# Patient Record
Sex: FEMALE | Race: BLACK OR AFRICAN AMERICAN | NOT HISPANIC OR LATINO | URBAN - METROPOLITAN AREA
[De-identification: names, ages, dates, MRNs, and addresses within clinical notes are randomized per-mention and may not be internally consistent; named-entity substitution may affect disease eponyms.]

---

## 2021-02-24 ENCOUNTER — OFFICE VISIT (OUTPATIENT)
Dept: GASTROENTEROLOGY | Facility: CLINIC | Age: 46
End: 2021-02-24
Payer: COMMERCIAL

## 2021-02-24 VITALS
WEIGHT: 265 LBS | TEMPERATURE: 98 F | DIASTOLIC BLOOD PRESSURE: 82 MMHG | BODY MASS INDEX: 44.15 KG/M2 | HEIGHT: 65 IN | SYSTOLIC BLOOD PRESSURE: 128 MMHG

## 2021-02-24 DIAGNOSIS — K59.09 CHRONIC CONSTIPATION: ICD-10-CM

## 2021-02-24 DIAGNOSIS — R13.10 DYSPHAGIA, UNSPECIFIED TYPE: ICD-10-CM

## 2021-02-24 DIAGNOSIS — R10.13 DYSPEPSIA: ICD-10-CM

## 2021-02-24 DIAGNOSIS — K21.9 GASTROESOPHAGEAL REFLUX DISEASE, UNSPECIFIED WHETHER ESOPHAGITIS PRESENT: Primary | ICD-10-CM

## 2021-02-24 PROCEDURE — 99204 OFFICE O/P NEW MOD 45 MIN: CPT | Performed by: INTERNAL MEDICINE

## 2021-02-24 RX ORDER — PANTOPRAZOLE SODIUM 40 MG/1
40 TABLET, DELAYED RELEASE ORAL DAILY
Qty: 30 TABLET | Refills: 5 | Status: SHIPPED | OUTPATIENT
Start: 2021-02-24 | End: 2021-08-21

## 2021-02-24 RX ORDER — LINACLOTIDE 145 UG/1
145 CAPSULE, GELATIN COATED ORAL DAILY
Qty: 30 CAPSULE | Refills: 5 | Status: SHIPPED | OUTPATIENT
Start: 2021-02-24 | End: 2021-09-18

## 2021-02-24 NOTE — PROGRESS NOTES
[facility] - Outpatient Consultation  Jennifer Silvestre 41 y o  female MRN: [id number]  Encounter: [id number]          ASSESSMENT AND PLAN:      43-year-old with diabetes, history of Gerald-en-Y gastric bypass, fibromyalgia, depression / anxiety, irritable bowel syndrome presents for evaluation  1   GERD   Uncontrolled  Could be likely causing nausea, vomiting and dysphagia  We will review EGD records  If EGD did not show any abnormality in the esophagus  And no change in symptoms with PPI then would get esophagram and esophageal manometry with pH study  Will start patient on PPI 40 mg daily  2  Dyspepsia  Could be due to history of gastric bypass  Other differentials include ulcer disease, gastritis, H pylori, functional   Will review EGD and colonoscopy records  Started patient on PPI  3  Chronic constipation  Refractory to MiraLax and Colace  Patient getting enemas for relief  Will start patient on Linzess  Continue other management  Will review colonoscopy records  If colonoscopy does not show any abnormality and no relief with bowel regimen then would get anorectal manometry to investigate further  Wilmar Hinson MD  Gastroenterology Fellow  [address]  Date: February 24, 2021    ______________________________________________________________________    HPI:    43-year-old with diabetes, history of Gerald-en-Y gastric bypass, fibromyalgia, depression / anxiety, irritable bowel syndrome presents for evaluation  Consult note from bariatric surgery mention esophageal spasms seen on upper GI series  No imaging to review in the records  Patient says that since Gerald-en-Y gastric bypass in 2012 she has been having nausea, vomiting, heartburn, abdominal pain along with hard stools intermittently  Currently she does not take any medications for heartburn  No blood in vomiting  She has noticed some bright red blood in stools    She is currently taking MiraLax 4 times a day along with Colace but no relief in constipation  She is having weight gain  No family history of GI or liver cancer  Nonsmoker, no alcohol intake, no NSAID intake  She had EGD and colonoscopy in July 2020 at [facility]  No records to review from the procedures in at today  REVIEW OF SYSTEMS:    CONSTITUTIONAL: Denies any fever, chills, rigors, and weight loss  HEENT: No earache or tinnitus  Denies hearing loss or visual disturbances  CARDIOVASCULAR: No chest pain or palpitations  RESPIRATORY: Denies any cough, hemoptysis, shortness of breath or dyspnea on exertion  GASTROINTESTINAL: As noted in the History of Present Illness  GENITOURINARY: No problems with urination  Denies any hematuria or dysuria  NEUROLOGIC: No dizziness or vertigo, denies headaches  MUSCULOSKELETAL: Denies any muscle or joint pain  SKIN: Denies skin rashes or itching  ENDOCRINE: Denies excessive thirst  Denies intolerance to heat or cold  PSYCHOSOCIAL: Denies depression or anxiety  Denies any recent memory loss  Historical Information   No past medical history on file  No past surgical history on file  Social History   Social History     Substance and Sexual Activity   Alcohol Use Not on file     Social History     Substance and Sexual Activity   Drug Use Not on file     Social History     Tobacco Use   Smoking Status Not on file     No family history on file  Meds/Allergies     No current outpatient medications on file  Not on File        Objective     There were no vitals taken for this visit  There is no height or weight on file to calculate BMI          PHYSICAL EXAM:      General Appearance:   Alert, cooperative, no distress   HEENT:   Normocephalic, atraumatic, anicteric      Neck:  Supple, symmetrical, trachea midline   Lungs:   Clear to auscultation bilaterally; no rales, rhonchi or wheezing; respirations unlabored    Heart[de-identified]   Regular rate and rhythm; no murmur, rub, or gallop  Abdomen:   Soft, non-tender, non-distended; normal bowel sounds; no masses, no organomegaly    Genitalia:   Deferred    Rectal:   Deferred    Extremities:  No cyanosis, clubbing or edema    Pulses:  2+ and symmetric    Skin:  No jaundice, rashes, or lesions    Lymph nodes:  No palpable cervical lymphadenopathy        Lab Results:   No visits with results within 1 Day(s) from this visit  Latest known visit with results is:   No results found for any previous visit  Radiology Results:   No results found

## 2021-03-01 ENCOUNTER — TELEPHONE (OUTPATIENT)
Dept: GASTROENTEROLOGY | Facility: CLINIC | Age: 46
End: 2021-03-01

## 2021-03-01 NOTE — TELEPHONE ENCOUNTER
[address] at [phone], and left VM at their Medical records department requesting pt's EGD/Colon report to be faxed to our office

## 2021-03-04 ENCOUNTER — TELEPHONE (OUTPATIENT)
Dept: GASTROENTEROLOGY | Facility: AMBULARY SURGERY CENTER | Age: 46
End: 2021-03-04

## 2021-03-04 NOTE — TELEPHONE ENCOUNTER
Patients GI provider:  Dr Sonia Paige    Number to return call: ( [phone]    Reason for call: Pt calling with stomach pain, constipation  and vomiting    Scheduled procedure/appointment date if applicable: Apt/procedure 5-19-21

## 2021-03-05 NOTE — TELEPHONE ENCOUNTER
Patient has hx of RYGB, GERD, dyspepsia IBS with constipation  Recommend start PPI and linzess at office visit Feb 24  Get colonoscopy/egd notes  Patient is reporting constipation, despite miralax, colace and enemas; also reporting vomiting after meals and said she is now on protein shakes only  She had telemedicine visit with bariatrics on Feb 17  She said she was told EGD showed esophageal spasm  Recommend continue PPI as ordered (only on one week)  She said linzess needs prior auth so unable to start  She is also in process of getting records from [facility] for last colonoscopy/egd  Follow up scheduled May 19  Please provide recommendation, do you think antiemetic would help  Thank you      Clinical Team:  Please initiate prior auth for LInzess 145 mcg and offer samples to patient (if we have)

## 2021-03-05 NOTE — TELEPHONE ENCOUNTER
I will start prior auth for Linzess today, is it ok to give patient samples in the meantime? Please advise        thank you

## 2021-03-05 NOTE — TELEPHONE ENCOUNTER
Patient is unable to get samples due to where she lives, she lives in [facility]

## 2021-03-09 ENCOUNTER — TELEPHONE (OUTPATIENT)
Dept: GASTROENTEROLOGY | Facility: CLINIC | Age: 46
End: 2021-03-09

## 2021-03-09 NOTE — TELEPHONE ENCOUNTER
Called [facility] for an update on prior auth for Linzess  I spoke to [facility], who states Rogerio Limon was denied  She faxed denial letter to office   Waiting to receive this and will notify provider

## 2021-03-09 NOTE — TELEPHONE ENCOUNTER
Hi Dr Shruti Davidson,    I received a denial letter for Yuri Salazar for this patient, insurance states it was denied due to: The patient must be treated for a diagnosis that is indicted in the FDA-approved package labeling such as :  -Chronic Idiopathic constipation  -IBS w/ Constipation   Or a medically accepted indication  Also need documentation that shows the patient has tried and failed therapy, had a intolerance or contraindication to :  -Fiber supplementation/bulk forming agents: such as fiber tablets 625mg or fiber powder   -Glycerin or bisacodyl suppositories     Letter scanned under media     Please advise how you would like me to proceed

## 2021-03-11 NOTE — TELEPHONE ENCOUNTER
He has chronic refractory idiopathic constipation and has failed prior therapies  This is acceptable indication for Linzess approval   Please kindly helped

## 2021-03-12 NOTE — TELEPHONE ENCOUNTER
Called [facility] for an update and spoke to Grisel Crowder who states additional information takes 24 hrs to be added into patient's chart, for me to call back this afternoon for an update

## 2021-03-12 NOTE — TELEPHONE ENCOUNTER
Hi Dr Thermon Blizzard,    I called [facility] for an update on this and there was not enough information provided  Can you write an addendum including the correct diagnosis of (chronic refractory idiopathic constipation)   As well as additional documentation that shows the patient has tried and failed therapy, had a intolerance or contraindication to :  -Fiber supplementation/bulk forming agents: such as fiber tablets 625mg or fiber powder   -Glycerin or bisacodyl suppositories       They are requesting specific medication names and I don't see any of these in your office note         Thank you

## 2021-03-17 NOTE — TELEPHONE ENCOUNTER
Thank you for calling her  Yes we can continue with dulcolax for now  Hold off on linzess for now   Thanks

## 2021-03-17 NOTE — TELEPHONE ENCOUNTER
I spoke to patient she states she has tried Fiber in the past but it actually made her constipation worse  She has also tried suppositories but did not have much success so she sticks with enemas  Patient  has purchased otc dulcolax chewables -she has been taking 2 in the AM, and 2 in the PM since the weekend, and has actually not been experiencing any constipation, she actually had a accident this morning  She wanted to know since the dulcolax is helping could you prescribe something similar to that for her to try and if so we can hold off on trying the get Linzess approved         Please advise,         Thank you

## 2021-03-17 NOTE — TELEPHONE ENCOUNTER
I called patient multiple times but not talk to him on the phone  Left voicemail to call back at the clinic  I hoped to clarify with him about fibre use and response  She is taking enemas currently for relief which is similar to suppositories  Could you please call patient again and check about use and response to these medications  Thank you

## 2021-03-19 ENCOUNTER — TELEPHONE (OUTPATIENT)
Dept: GASTROENTEROLOGY | Facility: AMBULARY SURGERY CENTER | Age: 46
End: 2021-03-19

## 2021-03-19 NOTE — TELEPHONE ENCOUNTER
Patients GI provider:  Dr Mary Diaz     Number to return call: [phone]    Reason for call: Pt calling requesting to speak with a nurse in reference to a chewable dulcolax that it stopped working for her with extreme constipation       Scheduled procedure/appointment date if applicable: Apt/procedure 5/19/21

## 2021-03-22 NOTE — TELEPHONE ENCOUNTER
Patient of Dr Jeromy Quijano, last seen 2/24/21    History of RXYGB, GERD, dyspepsia, chronic constipation    Called and spoke with patient  She states that she has been using chewable dulcolax 4x per day and saline enemas daily  At last appt linzess was prescribed but we were not able to get this medication approved (see 3/9 encounter)  Patient states that the dulcolax chewables had been working for her but then they stopped and she had several days with no bowel movement  She states she did have a bowel movement this morning but states oftentimes when she goes she feels she is not completely emptying her bowels  She has tried fiber in the past as well and felt this worsened her constipation  Patient states that she was already on Linzess many years ago when she was a patient with [place]  She states it worked at first and stopped working over time  She also is concerned that when she gives herself enemas, she feels that there is a "bloackage" and the fluid has a hard time going in  Patient states her most recent colonoscopy was a year ago with [facility] but every time she has a colonoscopy it's a poor prep because she's so constipated and they cannot visualize much   Please advise

## 2021-03-23 NOTE — TELEPHONE ENCOUNTER
Please get the procedure report for the colonoscopy and also proceed with paperwork for linzess given the new information  Tell patient to continue with current laxative regimen  If colonoscopy normal and linzess not able to get 1-2 bowels movements per day then we can evaluate with anal manometry and other test    Please kindly share the plan with patient as well  Thank you

## 2021-03-24 NOTE — TELEPHONE ENCOUNTER
Additional information faxed to [employer] this morning  I called [facility] for patient's records  Left detailed message on medical records line [phone]

## 2021-03-24 NOTE — TELEPHONE ENCOUNTER
Called patient to update her on the plan  I advised that we can provide linzess samples for her while we are awaiting approval from insurance   She states she lives in [place] but she can come by our [place] office on Friday

## 2021-03-25 NOTE — TELEPHONE ENCOUNTER
I called [facility] and spoke to Ata Choudhury regarding pending auth for [address]  I was able to give required clinical information over the phone and Licha Neri was approved  Approval letter to be faxed to our office  I called the patient's pharmacy and meds will be available today with a $3 co-pay for the pt, pt notified of this

## 2021-08-17 ENCOUNTER — TELEPHONE (OUTPATIENT)
Dept: OTHER | Facility: OTHER | Age: 46
End: 2021-08-17

## 2021-08-17 NOTE — TELEPHONE ENCOUNTER
Pt cancelled her apt for today  She is going to be seen at the ED  Pt will call back at a later date to reschedule

## 2021-08-20 DIAGNOSIS — K59.09 CHRONIC CONSTIPATION: ICD-10-CM

## 2021-08-20 DIAGNOSIS — K21.9 GASTROESOPHAGEAL REFLUX DISEASE, UNSPECIFIED WHETHER ESOPHAGITIS PRESENT: ICD-10-CM

## 2021-08-20 DIAGNOSIS — R13.10 DYSPHAGIA, UNSPECIFIED TYPE: ICD-10-CM

## 2021-08-20 DIAGNOSIS — R10.13 DYSPEPSIA: ICD-10-CM

## 2021-08-21 RX ORDER — PANTOPRAZOLE SODIUM 40 MG/1
TABLET, DELAYED RELEASE ORAL
Qty: 30 TABLET | Refills: 5 | Status: SHIPPED | OUTPATIENT
Start: 2021-08-21 | End: 2022-03-08

## 2021-09-18 DIAGNOSIS — R13.10 DYSPHAGIA, UNSPECIFIED TYPE: ICD-10-CM

## 2021-09-18 DIAGNOSIS — K59.09 CHRONIC CONSTIPATION: ICD-10-CM

## 2021-09-18 DIAGNOSIS — K21.9 GASTROESOPHAGEAL REFLUX DISEASE, UNSPECIFIED WHETHER ESOPHAGITIS PRESENT: ICD-10-CM

## 2021-09-18 DIAGNOSIS — R10.13 DYSPEPSIA: ICD-10-CM

## 2021-09-18 RX ORDER — LINACLOTIDE 145 UG/1
CAPSULE, GELATIN COATED ORAL
Qty: 30 CAPSULE | Refills: 5 | Status: SHIPPED | OUTPATIENT
Start: 2021-09-18 | End: 2022-02-16

## 2022-02-16 ENCOUNTER — OFFICE VISIT (OUTPATIENT)
Dept: GASTROENTEROLOGY | Facility: CLINIC | Age: 47
End: 2022-02-16
Payer: COMMERCIAL

## 2022-02-16 VITALS
OXYGEN SATURATION: 97 % | HEIGHT: 65 IN | RESPIRATION RATE: 18 BRPM | WEIGHT: 249 LBS | DIASTOLIC BLOOD PRESSURE: 70 MMHG | BODY MASS INDEX: 41.48 KG/M2 | SYSTOLIC BLOOD PRESSURE: 118 MMHG | HEART RATE: 80 BPM | TEMPERATURE: 97.8 F

## 2022-02-16 DIAGNOSIS — R13.10 DYSPHAGIA, UNSPECIFIED TYPE: ICD-10-CM

## 2022-02-16 DIAGNOSIS — K21.9 GASTROESOPHAGEAL REFLUX DISEASE, UNSPECIFIED WHETHER ESOPHAGITIS PRESENT: ICD-10-CM

## 2022-02-16 DIAGNOSIS — R10.13 DYSPEPSIA: ICD-10-CM

## 2022-02-16 DIAGNOSIS — K59.09 CHRONIC CONSTIPATION: ICD-10-CM

## 2022-02-16 PROCEDURE — 99214 OFFICE O/P EST MOD 30 MIN: CPT | Performed by: INTERNAL MEDICINE

## 2022-02-16 RX ORDER — LACTULOSE 10 G/15ML
SOLUTION ORAL
COMMUNITY
Start: 2021-12-22 | End: 2022-04-28

## 2022-02-16 RX ORDER — NYSTATIN 100000 U/G
1 OINTMENT TOPICAL 2 TIMES DAILY
COMMUNITY
Start: 2021-12-29

## 2022-02-16 RX ORDER — ACARBOSE 25 MG/1
25 TABLET ORAL
COMMUNITY
Start: 2022-02-03 | End: 2022-03-05

## 2022-02-16 RX ORDER — BUDESONIDE AND FORMOTEROL FUMARATE DIHYDRATE 160; 4.5 UG/1; UG/1
2 AEROSOL RESPIRATORY (INHALATION) 2 TIMES DAILY
COMMUNITY
Start: 2022-01-19

## 2022-02-16 RX ORDER — PHENTERMINE HYDROCHLORIDE 8 MG/1
8 TABLET ORAL
COMMUNITY
Start: 2022-02-11

## 2022-02-16 RX ORDER — PALIPERIDONE 1.5 MG/1
3 TABLET, EXTENDED RELEASE ORAL DAILY
COMMUNITY

## 2022-02-16 RX ORDER — ONABOTULINUMTOXINA 200 [USP'U]/1
INJECTION, POWDER, LYOPHILIZED, FOR SOLUTION INTRADERMAL; INTRAMUSCULAR
COMMUNITY
Start: 2022-02-15

## 2022-02-16 RX ORDER — CYANOCOBALAMIN 1000 UG/ML
1000 INJECTION INTRAMUSCULAR; SUBCUTANEOUS
COMMUNITY
Start: 2020-11-05

## 2022-02-16 RX ORDER — LIDOCAINE AND PRILOCAINE 25; 25 MG/G; MG/G
CREAM TOPICAL
COMMUNITY
Start: 2021-10-19

## 2022-02-16 RX ORDER — SODIUM FLUORIDE1.1%, POTASSIUM NITRATE 5% 5.8; 57.5 MG/ML; MG/ML
GEL, DENTIFRICE DENTAL
COMMUNITY
Start: 2022-01-20

## 2022-02-16 RX ORDER — BLOOD SUGAR DIAGNOSTIC
STRIP MISCELLANEOUS
COMMUNITY
Start: 2022-01-12

## 2022-02-16 RX ORDER — MUPIROCIN CALCIUM 20 MG/G
CREAM TOPICAL
COMMUNITY
Start: 2021-10-19

## 2022-02-16 RX ORDER — BUPROPION HYDROCHLORIDE 300 MG/1
300 TABLET ORAL DAILY
COMMUNITY
Start: 2022-01-06

## 2022-02-16 RX ORDER — IPRATROPIUM BROMIDE AND ALBUTEROL SULFATE 2.5; .5 MG/3ML; MG/3ML
SOLUTION RESPIRATORY (INHALATION)
COMMUNITY
Start: 2022-01-28

## 2022-02-16 RX ORDER — FOLIC ACID 1 MG/1
1 TABLET ORAL DAILY
COMMUNITY
Start: 2021-12-29

## 2022-02-16 RX ORDER — ACARBOSE 25 MG/1
TABLET ORAL
COMMUNITY
Start: 2022-02-03

## 2022-02-16 RX ORDER — ALBUTEROL SULFATE 2.5 MG/3ML
2.5 SOLUTION RESPIRATORY (INHALATION)
COMMUNITY

## 2022-02-16 RX ORDER — TOPIRAMATE 25 MG/1
1 TABLET ORAL 2 TIMES DAILY
COMMUNITY
Start: 2021-11-26

## 2022-02-16 RX ORDER — DEXAMETHASONE 6 MG/1
TABLET ORAL
COMMUNITY
Start: 2021-12-17

## 2022-02-16 RX ORDER — IPRATROPIUM BROMIDE AND ALBUTEROL SULFATE 2.5; .5 MG/3ML; MG/3ML
SOLUTION RESPIRATORY (INHALATION)
COMMUNITY
Start: 2021-10-19

## 2022-02-16 RX ORDER — ZOLPIDEM TARTRATE 12.5 MG/1
TABLET, FILM COATED, EXTENDED RELEASE ORAL
COMMUNITY
Start: 2022-02-10

## 2022-02-16 RX ORDER — PSEUDOEPHEDRINE HCL 30 MG
100 TABLET ORAL 2 TIMES DAILY PRN
COMMUNITY
Start: 2020-11-05

## 2022-02-16 RX ORDER — CARIPRAZINE 6 MG/1
6 CAPSULE, GELATIN COATED ORAL DAILY
COMMUNITY
Start: 2022-01-18

## 2022-02-16 RX ORDER — POLYETHYLENE GLYCOL 3350 17 G/17G
17 POWDER, FOR SOLUTION ORAL DAILY
Qty: 510 G | Refills: 5 | Status: SHIPPED | OUTPATIENT
Start: 2022-02-16 | End: 2022-08-15

## 2022-02-16 RX ORDER — TIZANIDINE 4 MG/1
1 TABLET ORAL EVERY 6 HOURS PRN
COMMUNITY
Start: 2022-02-14

## 2022-02-16 RX ORDER — BUDESONIDE AND FORMOTEROL FUMARATE DIHYDRATE 160; 4.5 UG/1; UG/1
2 AEROSOL RESPIRATORY (INHALATION) 2 TIMES DAILY
COMMUNITY
Start: 2021-03-04 | End: 2022-03-04

## 2022-02-16 RX ORDER — ERGOCALCIFEROL (VITAMIN D2) 1250 MCG
100000 CAPSULE ORAL
COMMUNITY
Start: 2021-11-10 | End: 2022-11-10

## 2022-02-16 RX ORDER — ERGOCALCIFEROL 1.25 MG/1
CAPSULE ORAL
COMMUNITY
Start: 2022-01-20

## 2022-02-16 RX ORDER — DOCUSATE SODIUM 100 MG/1
CAPSULE, LIQUID FILLED ORAL
COMMUNITY
Start: 2022-01-28

## 2022-02-16 RX ORDER — HYDROCORTISONE 5 MG/1
TABLET ORAL
COMMUNITY
Start: 2022-02-03

## 2022-02-16 NOTE — PATIENT INSTRUCTIONS
Scheduled date of colonoscopy/egd (as of today): 4/28/22  Physician performing colonoscopy: Dr Daylin Duran  Location of colonoscopy: [place]   Bowel prep reviewed with patient: golytely/dulcolax - 2 day prep  Instructions reviewed with patient by: Danay LY  Clearances:  n/a

## 2022-02-16 NOTE — PROGRESS NOTES
[facility] - Outpatient Follow-up Note  Karen Bender 40 y o  female MRN: [id number]  Encounter: [id number]          ASSESSMENT AND PLAN:      73-year-old with diabetes, Gerald-en-Y gastric bypass, fibromyalgia, depression/anxiety, presents for follow-up for GERD, dyspepsia and constipation  1  GERD   Currently controlled  Continue PPI  2  Dyspepsia/early satiety   Differentials include gastritis, H pylori, peptic ulcer disease specially marginal ulcer, anastomotic stricture  Will do EGD to investigate further  Continue PPI  3  Chronic constipation  Patient reports that she has had symptoms since childhood  Her daughter also having similar symptoms  She could have primary constipation, dyssynergic disorder, Hirschsprung, tumors  At this time we will do colonoscopy to investigate further  If colonoscopy negative for any etiology that could proceed further with getting anal manometry/Sitz markers test   Increase Linzess to 290 mcg daily  Add MiraLax daily which patient could increase to b i d  if no change in symptoms after 2 weeks of therapy  Continue enema as needed  4  Colon cancer screening   Patient due for colonoscopy  Order procedure with 2 day prep  RTC 3 months  Joyce Daniels MD  Gastroenterology Fellow  [address]  Date: February 16, 2022      ______________________________________________________________________    SUBJECTIVE:  73-year-old with diabetes, Gerald-en-Y gastric bypass, fibromyalgia, depression/anxiety, presents for follow-up for GERD, dyspepsia and constipation  During last visit in February 2021, EGD and colonoscopy reports were requested from [facility]  PPI and Linzess were started  He received of flexible sigmoidoscopy report from [facility] which showed solid stool and subsequently procedure aborted and colonoscopy recommended  No new labs since last visit    CBC and iron panel were normal in February 2021  Currently patient reports that heartburn is controlled  She does feed backed up after eating solids and also has early satiety after taking few bites of a meal   She continues to have problems with constipation  She is having 1 bowel movement sometimes every 1-2 weeks  She has to use enemas to have bowel movement despite taking MiraLax 145 mcg daily  She is taking Metamucil as well  He has noticed bright red blood sometimes after having a bowel movement  REVIEW OF SYSTEMS IS OTHERWISE NEGATIVE  Historical Information   History reviewed  No pertinent past medical history  History reviewed  No pertinent surgical history  Social History   Social History     Substance and Sexual Activity   Alcohol Use Never     Social History     Substance and Sexual Activity   Drug Use Never     Social History     Tobacco Use   Smoking Status Never Smoker   Smokeless Tobacco Never Used     History reviewed  No pertinent family history      Meds/Allergies       Current Outpatient Medications:     acarbose (PRECOSE) 25 mg tablet    acarbose (PRECOSE) 25 mg tablet    albuterol (2 5 mg/3 mL) 0 083 % nebulizer solution    Botox 200 units SOLR    budesonide-formoterol (Symbicort) 160-4 5 mcg/act inhaler    buPROPion (WELLBUTRIN XL) 300 mg 24 hr tablet    cyanocobalamin 1,000 mcg/mL    dexamethasone (DECADRON) 6 mg tablet    Diclofenac Sodium (VOLTAREN) 1 %    Diclofenac Sodium (VOLTAREN) 1 %    docusate sodium (COLACE) 100 mg capsule    Docusate Sodium (DSS) 100 MG CAPS    ergocalciferol (ERGOCALCIFEROL) 1 25 MG (84039 UT) capsule    ergocalciferol (VITAMIN D2) 03,251 units    folic acid (FOLVITE) 1 mg tablet    hydrocortisone (CORTEF) 5 mg tablet    ipratropium-albuterol (DUO-NEB) 0 5-2 5 mg/3 mL nebulizer solution    ipratropium-albuterol (DUO-NEB) 0 5-2 5 mg/3 mL nebulizer solution    lactulose (CHRONULAC) 10 g/15 mL solution    lidocaine-prilocaine (EMLA) cream    Linzess 145 MCG CAPS    mupirocin (Bactroban) 2 % cream    mupirocin (BACTROBAN) 2 % ointment    nystatin (MYCOSTATIN) ointment    OneTouch Ultra test strip    paliperidone (INVEGA) 1 5 MG 24 hr tablet    pantoprazole (PROTONIX) 40 mg tablet    Phentermine HCl (Lomaira) 8 MG TABS    Sodium Fluoride 5000 Sensitive 1 1-5 % GEL    Symbicort 160-4 5 MCG/ACT inhaler    tiZANidine (ZANAFLEX) 4 mg tablet    topiramate (TOPAMAX) 25 mg tablet    triamcinolone (KENALOG) 0 1 % ointment    Vraylar 6 MG capsule    zolpidem (AMBIEN CR) 12 5 MG CR tablet    Allergies   Allergen Reactions    Metoclopramide Shortness Of Breath, Itching and Other (See Comments)     Dystonic rxn  Dystonic reaction   Tramadol Itching     Itchy throat   Itchy throat       Albumen, Egg - Food Allergy Itching    Carbamazepine Itching    Citrullus Vulgaris Itching    Ketorolac Itching     Itching  Itching      Ketorolac Tromethamine Itching    Latex Itching and Hives    Oxycodone-Acetaminophen Vomiting    Medical Tape Itching and Rash    Peanut Oil - Food Allergy Itching and Rash    Sulfamethoxazole-Trimethoprim Hives and Rash           Objective     There were no vitals taken for this visit  There is no height or weight on file to calculate BMI  PHYSICAL EXAM:      General Appearance:   Alert, cooperative, no distress   HEENT:   Normocephalic, atraumatic, anicteric      Neck:  Supple, symmetrical, trachea midline   Lungs:   Clear to auscultation bilaterally; no rales, rhonchi or wheezing; respirations unlabored    Heart[de-identified]   Regular rate and rhythm; no murmur, rub, or gallop     Abdomen:   Soft, non-tender, non-distended; normal bowel sounds; no masses, no organomegaly    Genitalia:   Deferred    Rectal:   Deferred    Extremities:  No cyanosis, clubbing or edema    Pulses:  2+ and symmetric    Skin:  No jaundice, rashes, or lesions    Lymph nodes:  No palpable cervical lymphadenopathy        Lab Results:   No visits with results within 1 Day(s) from this visit  Latest known visit with results is:   No results found for any previous visit  Radiology Results:   No results found    Answers for HPI/ROS submitted by the patient on 2/15/2022  Chronicity: recurrent  Onset: more than 1 month ago  Onset quality: sudden  Frequency: constantly  Progression since onset: waxing and waning  Pain location: LLQ  Pain - numeric: 9/10  Pain quality: cramping, a sensation of fullness, sharp, tearing  Radiates to: back, pelvis  anorexia: Yes  arthralgias: Yes  belching: No  constipation: Yes  diarrhea: No  dysuria: No  fever: No  flatus: No  frequency: Yes  headaches: Yes  hematochezia: Yes  hematuria: No  melena: No  myalgias: Yes  nausea: Yes  weight loss: No  vomiting: Yes  Aggravated by: deep breathing, eating  Relieved by: nothing

## 2022-03-08 DIAGNOSIS — K21.9 GASTROESOPHAGEAL REFLUX DISEASE, UNSPECIFIED WHETHER ESOPHAGITIS PRESENT: ICD-10-CM

## 2022-03-08 DIAGNOSIS — R10.13 DYSPEPSIA: ICD-10-CM

## 2022-03-08 DIAGNOSIS — R13.10 DYSPHAGIA, UNSPECIFIED TYPE: ICD-10-CM

## 2022-03-08 DIAGNOSIS — K59.09 CHRONIC CONSTIPATION: ICD-10-CM

## 2022-03-08 RX ORDER — PANTOPRAZOLE SODIUM 40 MG/1
TABLET, DELAYED RELEASE ORAL
Qty: 30 TABLET | Refills: 5 | Status: SHIPPED | OUTPATIENT
Start: 2022-03-08

## 2022-04-21 ENCOUNTER — TELEPHONE (OUTPATIENT)
Dept: GASTROENTEROLOGY | Facility: CLINIC | Age: 47
End: 2022-04-21

## 2022-04-21 DIAGNOSIS — K21.9 GASTROESOPHAGEAL REFLUX DISEASE, UNSPECIFIED WHETHER ESOPHAGITIS PRESENT: ICD-10-CM

## 2022-04-21 DIAGNOSIS — R13.10 DYSPHAGIA, UNSPECIFIED TYPE: ICD-10-CM

## 2022-04-21 DIAGNOSIS — K59.09 CHRONIC CONSTIPATION: ICD-10-CM

## 2022-04-21 DIAGNOSIS — R10.13 DYSPEPSIA: ICD-10-CM

## 2022-04-21 NOTE — TELEPHONE ENCOUNTER
Patients GI provider:  Dr Holly Mode    Number to return call: [phone]    Reason for call: Pharmacy calling stating pt [id number]      Scheduled procedure/appointment date if applicable: Procedure 5/56/21

## 2022-04-28 ENCOUNTER — HOSPITAL ENCOUNTER (OUTPATIENT)
Dept: GASTROENTEROLOGY | Facility: HOSPITAL | Age: 47
Setting detail: OUTPATIENT SURGERY
Discharge: HOME | End: 2022-04-28
Attending: INTERNAL MEDICINE
Payer: COMMERCIAL

## 2022-04-28 ENCOUNTER — ANESTHESIA EVENT (OUTPATIENT)
Dept: GASTROENTEROLOGY | Facility: HOSPITAL | Age: 47
End: 2022-04-28

## 2022-04-28 ENCOUNTER — ANESTHESIA (OUTPATIENT)
Dept: GASTROENTEROLOGY | Facility: HOSPITAL | Age: 47
End: 2022-04-28

## 2022-04-28 VITALS
SYSTOLIC BLOOD PRESSURE: 117 MMHG | BODY MASS INDEX: 41.48 KG/M2 | RESPIRATION RATE: 18 BRPM | HEART RATE: 78 BPM | DIASTOLIC BLOOD PRESSURE: 80 MMHG | WEIGHT: 249 LBS | TEMPERATURE: 96.4 F | HEIGHT: 65 IN | OXYGEN SATURATION: 100 %

## 2022-04-28 DIAGNOSIS — R13.10 DYSPHAGIA, UNSPECIFIED TYPE: ICD-10-CM

## 2022-04-28 DIAGNOSIS — K59.09 CHRONIC CONSTIPATION: ICD-10-CM

## 2022-04-28 DIAGNOSIS — R10.13 DYSPEPSIA: ICD-10-CM

## 2022-04-28 DIAGNOSIS — K21.9 GASTROESOPHAGEAL REFLUX DISEASE, UNSPECIFIED WHETHER ESOPHAGITIS PRESENT: ICD-10-CM

## 2022-04-28 PROBLEM — M79.7 FIBROMYALGIA: Status: ACTIVE | Noted: 2019-05-22

## 2022-04-28 PROBLEM — F25.0 SCHIZOAFFECTIVE DISORDER, BIPOLAR TYPE (HCC): Status: ACTIVE | Noted: 2020-02-27

## 2022-04-28 PROBLEM — E78.2 MIXED HYPERLIPIDEMIA: Status: ACTIVE | Noted: 2020-02-27

## 2022-04-28 PROBLEM — E11.649 TYPE 2 DIABETES MELLITUS WITH HYPOGLYCEMIA WITHOUT COMA, WITHOUT LONG-TERM CURRENT USE OF INSULIN (HCC): Status: ACTIVE | Noted: 2021-04-08

## 2022-04-28 PROBLEM — M06.9 RHEUMATOID ARTHRITIS (HCC): Status: ACTIVE | Noted: 2020-11-18

## 2022-04-28 PROBLEM — J45.909 ASTHMA: Status: ACTIVE | Noted: 2022-04-28

## 2022-04-28 PROBLEM — F41.9 ANXIETY: Status: ACTIVE | Noted: 2020-02-27

## 2022-04-28 PROBLEM — Z98.84 HISTORY OF GASTRIC BYPASS: Status: ACTIVE | Noted: 2019-08-19

## 2022-04-28 PROCEDURE — 43235 EGD DIAGNOSTIC BRUSH WASH: CPT | Performed by: INTERNAL MEDICINE

## 2022-04-28 PROCEDURE — 45378 DIAGNOSTIC COLONOSCOPY: CPT | Performed by: INTERNAL MEDICINE

## 2022-04-28 RX ORDER — PROPOFOL 10 MG/ML
INJECTION, EMULSION INTRAVENOUS AS NEEDED
Status: DISCONTINUED | OUTPATIENT
Start: 2022-04-28 | End: 2022-04-28

## 2022-04-28 RX ORDER — SODIUM CHLORIDE 9 MG/ML
INJECTION, SOLUTION INTRAVENOUS CONTINUOUS PRN
Status: DISCONTINUED | OUTPATIENT
Start: 2022-04-28 | End: 2022-04-28

## 2022-04-28 RX ORDER — SODIUM CHLORIDE 9 MG/ML
50 INJECTION, SOLUTION INTRAVENOUS CONTINUOUS
Status: CANCELLED | OUTPATIENT
Start: 2022-04-28

## 2022-04-28 RX ADMIN — PROPOFOL 30 MG: 10 INJECTION, EMULSION INTRAVENOUS at 14:06

## 2022-04-28 RX ADMIN — PROPOFOL 30 MG: 10 INJECTION, EMULSION INTRAVENOUS at 14:14

## 2022-04-28 RX ADMIN — PROPOFOL 50 MG: 10 INJECTION, EMULSION INTRAVENOUS at 14:08

## 2022-04-28 RX ADMIN — PROPOFOL 30 MG: 10 INJECTION, EMULSION INTRAVENOUS at 14:26

## 2022-04-28 RX ADMIN — PROPOFOL 40 MG: 10 INJECTION, EMULSION INTRAVENOUS at 14:21

## 2022-04-28 RX ADMIN — PROPOFOL 100 MG: 10 INJECTION, EMULSION INTRAVENOUS at 13:51

## 2022-04-28 RX ADMIN — PROPOFOL 50 MG: 10 INJECTION, EMULSION INTRAVENOUS at 14:04

## 2022-04-28 RX ADMIN — PROPOFOL 150 MG: 10 INJECTION, EMULSION INTRAVENOUS at 13:46

## 2022-04-28 RX ADMIN — PROPOFOL 50 MG: 10 INJECTION, EMULSION INTRAVENOUS at 13:50

## 2022-04-28 RX ADMIN — SODIUM CHLORIDE: 0.9 INJECTION, SOLUTION INTRAVENOUS at 13:41

## 2022-04-28 RX ADMIN — PROPOFOL 50 MG: 10 INJECTION, EMULSION INTRAVENOUS at 13:59

## 2022-04-28 RX ADMIN — PROPOFOL 50 MG: 10 INJECTION, EMULSION INTRAVENOUS at 13:55

## 2022-04-28 NOTE — H&P
History and Physical - [facility]  Raiza Beckwith 56 y o  female MRN: [id number]                  HPI: Raiza Beckwith is a 56y o  year old female who presents for GERD and constipation      REVIEW OF SYSTEMS: Per the HPI, and otherwise unremarkable  Historical Information   No past medical history on file  No past surgical history on file  Social History   Social History     Substance and Sexual Activity   Alcohol Use Never     Social History     Substance and Sexual Activity   Drug Use Never     Social History     Tobacco Use   Smoking Status Never Smoker   Smokeless Tobacco Never Used     No family history on file      Meds/Allergies       Current Outpatient Medications:     acarbose (PRECOSE) 25 mg tablet    acarbose (PRECOSE) 25 mg tablet    albuterol (2 5 mg/3 mL) 0 083 % nebulizer solution    bisacodyl (DULCOLAX) 5 mg EC tablet    Botox 200 units SOLR    budesonide-formoterol (Symbicort) 160-4 5 mcg/act inhaler    buPROPion (WELLBUTRIN XL) 300 mg 24 hr tablet    cyanocobalamin 1,000 mcg/mL    dexamethasone (DECADRON) 6 mg tablet    Diclofenac Sodium (VOLTAREN) 1 %    Diclofenac Sodium (VOLTAREN) 1 %    docusate sodium (COLACE) 100 mg capsule    Docusate Sodium (DSS) 100 MG CAPS    ergocalciferol (ERGOCALCIFEROL) 1 25 MG (04961 UT) capsule    ergocalciferol (VITAMIN D2) 51,369 units    folic acid (FOLVITE) 1 mg tablet    hydrocortisone (CORTEF) 5 mg tablet    ipratropium-albuterol (DUO-NEB) 0 5-2 5 mg/3 mL nebulizer solution    ipratropium-albuterol (DUO-NEB) 0 5-2 5 mg/3 mL nebulizer solution    lactulose (CHRONULAC) 10 g/15 mL solution    lidocaine-prilocaine (EMLA) cream    linaCLOtide 290 MCG CAPS    mupirocin (Bactroban) 2 % cream    mupirocin (BACTROBAN) 2 % ointment    nystatin (MYCOSTATIN) ointment    OneTouch Ultra test strip    paliperidone (INVEGA) 1 5 MG 24 hr tablet    pantoprazole (PROTONIX) 40 mg tablet    Phentermine HCl (Lomaira) 8 MG TABS    polyethylene glycol (GOLYTELY) 4000 mL solution    polyethylene glycol (MIRALAX) 17 g packet    Sodium Fluoride 5000 Sensitive 1 1-5 % GEL    Symbicort 160-4 5 MCG/ACT inhaler    tiZANidine (ZANAFLEX) 4 mg tablet    topiramate (TOPAMAX) 25 mg tablet    triamcinolone (KENALOG) 0 1 % ointment    Vraylar 6 MG capsule    zolpidem (AMBIEN CR) 12 5 MG CR tablet    Allergies   Allergen Reactions    Metoclopramide Shortness Of Breath, Itching and Other (See Comments)     Dystonic rxn  Dystonic reaction   Tramadol Itching     Itchy throat   Itchy throat       Albumen, Egg - Food Allergy Itching    Carbamazepine Itching    Citrullus Vulgaris Itching    Ketorolac Itching     Itching  Itching      Ketorolac Tromethamine Itching    Latex Itching and Hives    Oxycodone-Acetaminophen Vomiting    Medical Tape Itching and Rash    Peanut Oil - Food Allergy Itching and Rash    Sulfamethoxazole-Trimethoprim Hives and Rash       Objective     There were no vitals taken for this visit  PHYSICAL EXAM    Gen: NAD  Head: NCAT  CV: RRR  CHEST: Clear  ABD: soft, NT/ND  EXT: no edema      ASSESSMENT/PLAN:  This is a 56y o  year old female here for EGD and colonoscopy, and she is stable and optimized for her procedure

## 2022-05-11 ENCOUNTER — OFFICE VISIT (OUTPATIENT)
Dept: GASTROENTEROLOGY | Facility: CLINIC | Age: 47
End: 2022-05-11
Payer: COMMERCIAL

## 2022-05-11 VITALS
DIASTOLIC BLOOD PRESSURE: 77 MMHG | HEART RATE: 79 BPM | BODY MASS INDEX: 40.72 KG/M2 | TEMPERATURE: 97.7 F | WEIGHT: 244.4 LBS | HEIGHT: 65 IN | SYSTOLIC BLOOD PRESSURE: 117 MMHG

## 2022-05-11 DIAGNOSIS — K59.09 CHRONIC CONSTIPATION: Primary | ICD-10-CM

## 2022-05-11 PROCEDURE — 99214 OFFICE O/P EST MOD 30 MIN: CPT | Performed by: INTERNAL MEDICINE

## 2022-05-11 RX ORDER — APIXABAN 2.5 MG/1
TABLET, FILM COATED ORAL
COMMUNITY
Start: 2022-03-04

## 2022-05-11 RX ORDER — MOMETASONE FUROATE AND FORMOTEROL FUMARATE DIHYDRATE 200; 5 UG/1; UG/1
2 AEROSOL RESPIRATORY (INHALATION) 2 TIMES DAILY
COMMUNITY
Start: 2022-03-10

## 2022-05-11 RX ORDER — FLUOXETINE 10 MG/1
1 CAPSULE ORAL DAILY
COMMUNITY
Start: 2022-05-09 | End: 2022-08-07

## 2022-05-11 RX ORDER — OXYCODONE HYDROCHLORIDE 5 MG/1
TABLET ORAL
COMMUNITY
Start: 2022-03-04

## 2022-05-11 NOTE — PROGRESS NOTES
[facility] - Outpatient Follow-up Note  Sherryle Chain 56 y o  female MRN: [id number]  Encounter: [id number]          ASSESSMENT AND PLAN:      56-year-old with history of Gerald-en-Y gastric bypass, status post cholecystectomy, depression/anxiety, fibromyalgia, GERD, dyspepsia, early satiety, chronic constipation presents for follow-up  1  Chronic constipation  2  Abdominal pain  3  Early satiety    Uncontrolled constipation  Could be due to slow transit versus dyssynergic disorder versus a combination of both  She has had multiple abdominal surgeries so may have adhesions result in slow transit as well  At this time we will do Sitz marker study depending on the findings of the study we might proceed with anal manometry  Continue current regimen including Linzess, MiraLax and enemas as needed  4  Chronic GERD  Currently controlled  Continue PPI  RTC 4 months  Mo Box MD  Gastroenterology Fellow  [address]  Date: May 11, 2022     ______________________________________________________________________    SUBJECTIVE:  56-year-old with history of Gerald-en-Y gastric bypass, status post cholecystectomy, depression/anxiety, fibromyalgia, GERD, dyspepsia, early satiety, chronic constipation presents for follow-up  During last visit we ordered EGD and colonoscopy, increase Linzess to 290 mcg daily, added MiraLax twice daily and encourage patient to continue enemas as needed  EGD was normal including gastrojejunal anastomosis  Colonoscopy was also normal   Procedures were done 2 weeks ago  Currently patient continues to report that she has been having low frequency of stools  She says she has only had 3 bowel movements since procedures were completed 2 weeks ago  He has bowel movements were loose in consistency  No blood in stools  Taking the bowel regimen as well as enemas    However she takes an enema only output she gets is the enema itself was not a bowel movement  She has left lower quadrant abdominal pain intermittently  Her heartburn is controlled  She has early fullness whenever she is unable to have regular bowel movements  Patient says she used to have loose bowel movements prior to Gerald-en-Y gastric bypass and has been more constipated since the surgery  She does note that sutures were removed from the small bowel at a later stage after Gerald-en-Y gastric bypass surgery as she was vomiting of fecal matter after the gastric bypass  REVIEW OF SYSTEMS IS OTHERWISE NEGATIVE  Historical Information   History reviewed  No pertinent past medical history  Past Surgical History:   Procedure Laterality Date    ABDOMINAL SURGERY      gastric bypass    HYSTERECTOMY      JOINT REPLACEMENT       Social History   Social History     Substance and Sexual Activity   Alcohol Use Never     Social History     Substance and Sexual Activity   Drug Use Never     Social History     Tobacco Use   Smoking Status Never Smoker   Smokeless Tobacco Never Used     History reviewed  No pertinent family history      Meds/Allergies       Current Outpatient Medications:     FLUoxetine (PROzac) 10 mg capsule    mometasone-formoterol (Dulera) 200-5 MCG/ACT inhaler    acarbose (PRECOSE) 25 mg tablet    acarbose (PRECOSE) 25 mg tablet    albuterol (2 5 mg/3 mL) 0 083 % nebulizer solution    bisacodyl (DULCOLAX) 5 mg EC tablet    Botox 200 units SOLR    budesonide-formoterol (Symbicort) 160-4 5 mcg/act inhaler    buPROPion (WELLBUTRIN XL) 300 mg 24 hr tablet    cyanocobalamin 1,000 mcg/mL    dexamethasone (DECADRON) 6 mg tablet    Diclofenac Sodium (VOLTAREN) 1 %    Diclofenac Sodium (VOLTAREN) 1 %    docusate sodium (COLACE) 100 mg capsule    Docusate Sodium (DSS) 100 MG CAPS    Eliquis 2 5 MG    ergocalciferol (ERGOCALCIFEROL) 1 25 MG (79152 UT) capsule    ergocalciferol (VITAMIN D2) 07,201 units    folic acid (FOLVITE) 1 mg tablet   hydrocortisone (CORTEF) 5 mg tablet    ipratropium-albuterol (DUO-NEB) 0 5-2 5 mg/3 mL nebulizer solution    ipratropium-albuterol (DUO-NEB) 0 5-2 5 mg/3 mL nebulizer solution    lidocaine-prilocaine (EMLA) cream    linaCLOtide 290 MCG CAPS    mupirocin (Bactroban) 2 % cream    mupirocin (BACTROBAN) 2 % ointment    nystatin (MYCOSTATIN) ointment    OneTouch Ultra test strip    oxyCODONE (ROXICODONE) 5 immediate release tablet    paliperidone (INVEGA) 1 5 MG 24 hr tablet    pantoprazole (PROTONIX) 40 mg tablet    Phentermine HCl (Lomaira) 8 MG TABS    polyethylene glycol (GOLYTELY) 4000 mL solution    polyethylene glycol (MIRALAX) 17 g packet    Sodium Fluoride 5000 Sensitive 1 1-5 % GEL    Symbicort 160-4 5 MCG/ACT inhaler    tiZANidine (ZANAFLEX) 4 mg tablet    topiramate (TOPAMAX) 25 mg tablet    triamcinolone (KENALOG) 0 1 % ointment    Vraylar 6 MG capsule    zolpidem (AMBIEN CR) 12 5 MG CR tablet    Allergies   Allergen Reactions    Metoclopramide Shortness Of Breath, Itching and Other (See Comments)     Dystonic rxn  Dystonic reaction   Tramadol Itching     Itchy throat   Itchy throat       Albumen, Egg - Food Allergy Itching    Carbamazepine Itching    Citrullus Vulgaris Itching    Ketorolac Itching     Itching  Itching      Ketorolac Tromethamine Itching    Latex Itching and Hives    Oxycodone-Acetaminophen Vomiting    Medical Tape Itching and Rash    Peanut Oil - Food Allergy Itching and Rash    Sulfamethoxazole-Trimethoprim Hives and Rash           Objective     Blood pressure 117/77, pulse 79, temperature 97 7 °F (36 5 °C), temperature source Tympanic, height 5' 5" (1 651 m), weight 111 kg (244 lb 6 4 oz)  Body mass index is 40 67 kg/m²        PHYSICAL EXAM:      General Appearance:   Alert, cooperative, no distress   HEENT:   Normocephalic, atraumatic, anicteric      Neck:  Supple, symmetrical, trachea midline   Lungs:   Clear to auscultation bilaterally; no rales, rhonchi or wheezing; respirations unlabored    Heart[de-identified]   Regular rate and rhythm; no murmur, rub, or gallop  Abdomen:   Soft, non-tender, non-distended; normal bowel sounds; no masses, no organomegaly    Genitalia:   Deferred    Rectal:   Deferred    Extremities:  No cyanosis, clubbing or edema    Pulses:  2+ and symmetric    Skin:  No jaundice, rashes, or lesions    Lymph nodes:  No palpable cervical lymphadenopathy        Lab Results:   No visits with results within 1 Day(s) from this visit  Latest known visit with results is:   No results found for any previous visit  Radiology Results:   EGD    Result Date: 4/28/2022  Narrative: [address] [place] [address] Endoscopy [id number] [address] [address]: 4/28/22 PHYSICIAN(S): Attending: Corey Tai DO Fellow: Adali Osman MD INDICATION: Dyspepsia, Chronic constipation, Dysphagia, unspecified type, Gastroesophageal reflux disease, unspecified whether esophagitis present POST-OP DIAGNOSIS: See the impression below  PREPROCEDURE: Informed consent was obtained for the procedure, including sedation  Risks of perforation, hemorrhage, adverse drug reaction and aspiration were discussed  The patient was placed in the left lateral decubitus position  Patient was explained about the risks and benefits of the procedure  Risks including but not limited to bleeding, infection, and perforation were explained in detail  Also explained about less than 100% sensitivity with the exam and other alternatives  DETAILS OF PROCEDURE: Patient was taken to the procedure room where a time out was performed to confirm correct patient and correct procedure  The patient underwent monitored anesthesia care, which was administered by an anesthesia professional  The patient's blood pressure, heart rate, level of consciousness, respirations, oxygen and ETCO2 were monitored throughout the procedure   The scope was introduced through the mouth and advanced to the second part of the duodenum  Retroflexion was performed in the fundus  Prior to the procedure, the patient's H  Pylori status was unknown  The patient experienced no blood loss  The procedure was not difficult  The patient tolerated the procedure well  There were no apparent complications  ANESTHESIA INFORMATION: ASA: III Anesthesia Type: IV Sedation with Anesthesia MEDICATIONS: No administrations occurring from 1341 to 1400 on 04/28/22 FINDINGS: The esophagus, gastric pouch, gastric suture site, gastrojejunal anastomosis and jejunum appeared normal  Regular Z-line 38 cm from the incisors SPECIMENS: * No specimens in log *     Impression: S/p gastric bypass, anastomosis appeared healthy, suture seen No evidence of Kruse's esophagus Normal jejunum RECOMMENDATION: PPI as needed for GERD Proceed with colonoscopy  ATTENDING ATTESTATION:  I was present throughout the entire procedure from insertion to complete withdrawal of the scope  I performed all interventions myself or oversaw the fellow  Suzie Ponce DO     Colonoscopy    Result Date: 4/28/2022  Narrative: [address] [place] [id number] [phone] DATE OF SERVICE: 4/28/22 PHYSICIAN(S): Attending: Genetta Hashimoto, DO Fellow: Trixie Lyn MD INDICATION: Dyspepsia, Chronic constipation, Dysphagia, unspecified type, Gastroesophageal reflux disease, unspecified whether esophagitis present POST-OP DIAGNOSIS: See the impression below  HISTORY: Prior colonoscopy: 4 years ago  BOWEL PREPARATION: Golytely/Colyte/Trilyte PREPROCEDURE: Informed consent was obtained for the procedure, including sedation  Risks including but not limited to bleeding, infection, perforation, adverse drug reaction and aspiration were explained in detail  Also explained about less than 100% sensitivity with the exam and other alternatives  The patient was placed in the left lateral decubitus position   DETAILS OF PROCEDURE: Patient was taken to the procedure room where a time out was performed to confirm correct patient and correct procedure  The patient underwent monitored anesthesia care, which was administered by an anesthesia professional  The patient's blood pressure, heart rate, level of consciousness, oxygen, respirations and ETCO2 were monitored throughout the procedure  A digital rectal exam was performed  A perianal exam was performed  The scope was introduced through the anus and advanced to the cecum  Retroflexion was performed in the rectum  The quality of bowel preparation was evaluated using the [facility] Bowel Preparation Scale with scores of: right colon = 2, transverse colon = 2, left colon = 2  The total BBPS score was 6  Bowel prep was adequate  The patient experienced no blood loss  The procedure was not difficult  The patient tolerated the procedure well  There were no apparent complications  ANESTHESIA INFORMATION: ASA: III Anesthesia Type: IV Sedation with Anesthesia MEDICATIONS: No administrations occurring from 1341 to 1431 on 04/28/22 FINDINGS: All observed locations appeared normal, including the entire colon  EVENTS: Procedure Events Event Event Time ENDO SCOPE OUT TIME 4/28/2022  1:59 PM ENDO CECUM REACHED 4/28/2022  2:19 PM ENDO SCOPE OUT TIME 4/28/2022  2:30 PM SPECIMENS: * No specimens in log * EQUIPMENT: Colonoscope -[id number] ENDOCUFF VISION LRG GREEN ID [id number]     Impression: Normal colonoscopy RECOMMENDATION: Repeat screening colonoscopy in 10 years  ATTENDING ATTESTATION:  I was present throughout the entire procedure from insertion to complete withdrawal of the scope  I performed all interventions myself or oversaw the fellow     Rickey Ponce DO   Answers for HPI/ROS submitted by the patient on 5/11/2022  Chronicity: chronic  Onset: more than 1 year ago  Onset quality: gradual  Frequency: every several days  Progression since onset: waxing and waning  Pain location: LUQ  Pain - numeric: 9/10  Pain quality: a sensation of fullness, sharp, tearing  Radiates to: LLQ, back  anorexia: Yes  arthralgias: Yes  belching: No  constipation: Yes  diarrhea: No  fever: No  frequency: Yes  headaches: Yes  hematochezia: Yes  hematuria: No  melena: No  myalgias: Yes  nausea: Yes  weight loss: No  vomiting: No  Aggravated by: bowel movement, certain positions, eating, movement, palpation  Relieved by: nothing  Diagnostic workup: lower endoscopy, upper endoscopy

## 2022-08-19 ENCOUNTER — TELEPHONE (OUTPATIENT)
Dept: GASTROENTEROLOGY | Facility: AMBULARY SURGERY CENTER | Age: 47
End: 2022-08-19

## 2022-08-19 DIAGNOSIS — K59.09 CHRONIC CONSTIPATION: Primary | ICD-10-CM

## 2022-08-19 NOTE — TELEPHONE ENCOUNTER
Spoke with xray tech advised XR colon transit study needs to be 4 separate orders     Then in the comment section must say:     For Order 1: Walters Sitz marker study pill  For order 2: Walters Sitz marker study Day 1  For Order 3: Walters sitz marker study Day 3   For order 4: Walters sitz marker study Day 5    X ray tech would like a copy of each of these orders faxed to [phone]

## 2022-09-06 ENCOUNTER — OFFICE VISIT (OUTPATIENT)
Dept: GASTROENTEROLOGY | Facility: CLINIC | Age: 47
End: 2022-09-06
Payer: COMMERCIAL

## 2022-09-06 VITALS
DIASTOLIC BLOOD PRESSURE: 70 MMHG | WEIGHT: 229 LBS | SYSTOLIC BLOOD PRESSURE: 130 MMHG | TEMPERATURE: 97.3 F | BODY MASS INDEX: 38.15 KG/M2 | OXYGEN SATURATION: 96 % | HEART RATE: 91 BPM | HEIGHT: 65 IN

## 2022-09-06 DIAGNOSIS — K59.09 CHRONIC CONSTIPATION: ICD-10-CM

## 2022-09-06 DIAGNOSIS — A04.8 H. PYLORI INFECTION: Primary | ICD-10-CM

## 2022-09-06 PROCEDURE — 99214 OFFICE O/P EST MOD 30 MIN: CPT | Performed by: INTERNAL MEDICINE

## 2022-09-06 NOTE — PROGRESS NOTES
[facility] - Outpatient Follow-up Note  Donald Catherine 56 y o  female MRN: [id number]  Encounter: [id number]          ASSESSMENT AND PLAN:      1  Nausea  2  Dyspepsia   3  History of  H  pylori infection  4  Chronic constipation  Five GERD      Will check H pylori stool antigen to confirm eradication prior H pylori infection for which patient got triple therapy in the past   EGD had shown healthy anastomosis last year  Symptoms could also be due to uncontrolled constipation  Colon transit study was normal   Will send patient for anal manometry to look for any dyssynergic dysfunction  Encouraged patient to discuss with Bariatric Surgeon about her upper GI symptoms given history bariatric surgery which can cause gastric dysmotility  Continue PPI but she would need to stop it 2 weeks prior to taking the H pylori test   Will get EKG before starting patient on any antiemetics if needed  Patient agreeable with plan of care  RTC 6 months  Oliverio Tyson MD  Gastroenterology  [place] [address]  Date: September 6, 2022    - H  pylori antigen, stool; Future  - Ambulatory Referral to Physical Therapy; Future  - ECG 12 lead; Future    ______________________________________________________________________    SUBJECTIVE:  78-year-old with history of Gerald-en-Y gastric bypass, H pylori infection GERD, dyspepsia refractory constipation status post cholecystectomy presents for follow-up  During last visit in May 2022, patient was taking Linzess, MiraLax enemas and still doing manual disimpaction intermittently  Heartburn was under control  She had already had EGD and colonoscopy in December 2021  EGD had shown healthy anastomosis  Colonoscopy was normal terminal ileum and colon  At that time be ordered colon transit study which turned out to be normal       Currently, patient continues to report hard stools  She has to do manual disimpaction 3 to 4 times a month  She has abdominal pain and cramps which improved with passage of bowel movement  She started feeling nausea almost on a daily basis lately  She continues to take PPI daily  She has appointment with bariatric surgeon in 2 weeks  She has knee surgery planned in the next few weeks as well  REVIEW OF SYSTEMS IS OTHERWISE NEGATIVE  Historical Information   History reviewed  No pertinent past medical history  Past Surgical History:   Procedure Laterality Date    ABDOMINAL SURGERY      gastric bypass    HYSTERECTOMY      JOINT REPLACEMENT       Social History   Social History     Substance and Sexual Activity   Alcohol Use Never     Social History     Substance and Sexual Activity   Drug Use Never     Social History     Tobacco Use   Smoking Status Never Smoker   Smokeless Tobacco Never Used     History reviewed  No pertinent family history      Meds/Allergies       Current Outpatient Medications:     acarbose (PRECOSE) 25 mg tablet    albuterol (2 5 mg/3 mL) 0 083 % nebulizer solution    Botox 200 units SOLR    budesonide-formoterol (SYMBICORT) 160-4 5 mcg/act inhaler    buPROPion (WELLBUTRIN XL) 300 mg 24 hr tablet    cyanocobalamin 1,000 mcg/mL    dexamethasone (DECADRON) 6 mg tablet    docusate sodium (COLACE) 100 mg capsule    Eliquis 2 5 MG    ergocalciferol (ERGOCALCIFEROL) 1 25 MG (21685 UT) capsule    ergocalciferol (VITAMIN D2) 50,000 units    FLUoxetine (PROzac) 10 mg capsule    folic acid (FOLVITE) 1 mg tablet    hydrocortisone (CORTEF) 5 mg tablet    ipratropium-albuterol (DUO-NEB) 0 5-2 5 mg/3 mL nebulizer solution    ipratropium-albuterol (DUO-NEB) 0 5-2 5 mg/3 mL nebulizer solution    lidocaine-prilocaine (EMLA) cream    linaCLOtide 290 MCG CAPS    mometasone-formoterol (Dulera) 200-5 MCG/ACT inhaler    mupirocin (BACTROBAN) 2 % cream    mupirocin (BACTROBAN) 2 % ointment    nystatin (MYCOSTATIN) ointment    OneTouch Ultra test strip    pantoprazole (PROTONIX) 40 mg tablet    Phentermine HCl (Lomaira) 8 MG TABS    Sodium Fluoride 5000 Sensitive 1 1-5 % GEL    Symbicort 160-4 5 MCG/ACT inhaler    tiZANidine (ZANAFLEX) 4 mg tablet    topiramate (TOPAMAX) 25 mg tablet    triamcinolone (KENALOG) 0 1 % ointment    Vraylar 6 MG capsule    zolpidem (AMBIEN CR) 12 5 MG CR tablet    acarbose (PRECOSE) 25 mg tablet    bisacodyl (DULCOLAX) 5 mg EC tablet    Diclofenac Sodium (VOLTAREN) 1 %    Diclofenac Sodium (VOLTAREN) 1 %    Docusate Sodium (DSS) 100 MG CAPS    oxyCODONE (ROXICODONE) 5 immediate release tablet    paliperidone (INVEGA) 1 5 MG 24 hr tablet    polyethylene glycol (GOLYTELY) 4000 mL solution    polyethylene glycol (MIRALAX) 17 g packet    Allergies   Allergen Reactions    Metoclopramide Shortness Of Breath, Itching and Other (See Comments)     Dystonic rxn  Dystonic reaction   Tramadol Itching     Itchy throat   Itchy throat       Albumen, Egg - Food Allergy Itching    Carbamazepine Itching    Citrullus Vulgaris Itching    Ketorolac Itching     Itching  Itching      Ketorolac Tromethamine Itching    Latex Itching and Hives    Oxycodone-Acetaminophen Vomiting    Medical Tape Itching and Rash    Peanut Oil - Food Allergy Itching and Rash    Sulfamethoxazole-Trimethoprim Hives and Rash           Objective     Blood pressure 130/70, pulse 91, temperature (!) 97 3 °F (36 3 °C), temperature source Tympanic, height 5' 5" (1 651 m), weight 104 kg (229 lb), SpO2 96 %  Body mass index is 38 11 kg/m²  PHYSICAL EXAM:      General Appearance:   Alert, cooperative, no distress   HEENT:   Normocephalic, atraumatic, anicteric      Neck:  Supple, symmetrical, trachea midline   Lungs:   Clear to auscultation bilaterally; no rales, rhonchi or wheezing; respirations unlabored    Heart[de-identified]   Regular rate and rhythm; no murmur, rub, or gallop     Abdomen:   Soft, non-tender, non-distended; normal bowel sounds; no masses, no organomegaly    Genitalia:   Deferred    Rectal:   Deferred    Extremities:  No cyanosis, clubbing or edema    Pulses:  2+ and symmetric    Skin:  No jaundice, rashes, or lesions    Lymph nodes:  No palpable cervical lymphadenopathy        Lab Results:   No visits with results within 1 Day(s) from this visit  Latest known visit with results is:   No results found for any previous visit  Radiology Results:   No results found

## 2022-11-03 DIAGNOSIS — R13.10 DYSPHAGIA, UNSPECIFIED TYPE: ICD-10-CM

## 2022-11-03 DIAGNOSIS — K59.09 CHRONIC CONSTIPATION: ICD-10-CM

## 2022-11-03 DIAGNOSIS — K21.9 GASTROESOPHAGEAL REFLUX DISEASE, UNSPECIFIED WHETHER ESOPHAGITIS PRESENT: ICD-10-CM

## 2022-11-03 DIAGNOSIS — R10.13 DYSPEPSIA: ICD-10-CM

## 2022-11-03 RX ORDER — PANTOPRAZOLE SODIUM 40 MG/1
TABLET, DELAYED RELEASE ORAL
Qty: 30 TABLET | Refills: 5 | Status: SHIPPED | OUTPATIENT
Start: 2022-11-03

## 2023-05-02 DIAGNOSIS — R13.10 DYSPHAGIA, UNSPECIFIED TYPE: ICD-10-CM

## 2023-05-02 DIAGNOSIS — R10.13 DYSPEPSIA: ICD-10-CM

## 2023-05-02 DIAGNOSIS — K21.9 GASTROESOPHAGEAL REFLUX DISEASE, UNSPECIFIED WHETHER ESOPHAGITIS PRESENT: ICD-10-CM

## 2023-05-02 DIAGNOSIS — K59.09 CHRONIC CONSTIPATION: ICD-10-CM

## 2023-05-02 RX ORDER — PANTOPRAZOLE SODIUM 40 MG/1
TABLET, DELAYED RELEASE ORAL
Qty: 30 TABLET | Refills: 0 | Status: SHIPPED | OUTPATIENT
Start: 2023-05-02

## 2023-06-01 DIAGNOSIS — K21.9 GASTROESOPHAGEAL REFLUX DISEASE, UNSPECIFIED WHETHER ESOPHAGITIS PRESENT: ICD-10-CM

## 2023-06-01 DIAGNOSIS — K59.09 CHRONIC CONSTIPATION: ICD-10-CM

## 2023-06-01 DIAGNOSIS — R10.13 DYSPEPSIA: ICD-10-CM

## 2023-06-01 DIAGNOSIS — R13.10 DYSPHAGIA, UNSPECIFIED TYPE: ICD-10-CM

## 2023-06-01 RX ORDER — PANTOPRAZOLE SODIUM 40 MG/1
TABLET, DELAYED RELEASE ORAL
Qty: 30 TABLET | Refills: 0 | Status: SHIPPED | OUTPATIENT
Start: 2023-06-01

## 2023-11-16 ENCOUNTER — TELEPHONE (OUTPATIENT)
Age: 48
End: 2023-11-16

## 2023-11-16 NOTE — TELEPHONE ENCOUNTER
Rec'd call from patient & insurance rep from [facility] requesting NEW for ALOPECIA. Explained wait/cancellation list processes & MyChart notification. Understanding was verbalized. Created wait/cancellation list for patient.     [facility] #[id number]

## 2024-03-22 ENCOUNTER — NURSE TRIAGE (OUTPATIENT)
Age: 49
End: 2024-03-22

## 2024-03-22 ENCOUNTER — OFFICE VISIT (OUTPATIENT)
Dept: DERMATOLOGY | Facility: CLINIC | Age: 49
End: 2024-03-22
Payer: COMMERCIAL

## 2024-03-22 VITALS — WEIGHT: 209 LBS | HEIGHT: 65 IN | BODY MASS INDEX: 34.82 KG/M2

## 2024-03-22 DIAGNOSIS — L66.8 CENTRAL CENTRIFUGAL SCARRING ALOPECIA: Primary | ICD-10-CM

## 2024-03-22 DIAGNOSIS — L63.9 ALOPECIA AREATA: ICD-10-CM

## 2024-03-22 PROCEDURE — 99204 OFFICE O/P NEW MOD 45 MIN: CPT | Performed by: DERMATOLOGY

## 2024-03-22 RX ORDER — DULOXETIN HYDROCHLORIDE 30 MG/1
30 CAPSULE, DELAYED RELEASE ORAL DAILY
COMMUNITY
Start: 2023-11-24

## 2024-03-22 RX ORDER — LANCETS 30 GAUGE
EACH MISCELLANEOUS
COMMUNITY
Start: 2024-01-21

## 2024-03-22 RX ORDER — CLOBETASOL PROPIONATE 0.46 MG/ML
SOLUTION TOPICAL 2 TIMES DAILY
Qty: 50 ML | Refills: 2 | Status: SHIPPED | OUTPATIENT
Start: 2024-03-22 | End: 2024-04-21

## 2024-03-22 RX ORDER — RIBOFLAVIN (VITAMIN B2) 100 MG
200 TABLET ORAL DAILY
COMMUNITY
Start: 2023-10-23

## 2024-03-22 RX ORDER — DUTASTERIDE 0.5 MG/1
0.5 CAPSULE, LIQUID FILLED ORAL DAILY
Qty: 90 CAPSULE | Refills: 0 | Status: SHIPPED | OUTPATIENT
Start: 2024-03-22 | End: 2024-06-20

## 2024-03-22 RX ORDER — SEMAGLUTIDE 2.4 MG/.75ML
2.4 INJECTION, SOLUTION SUBCUTANEOUS WEEKLY
COMMUNITY
Start: 2023-12-14

## 2024-03-22 RX ORDER — GALCANEZUMAB 120 MG/ML
INJECTION, SOLUTION SUBCUTANEOUS
COMMUNITY
Start: 2024-01-02

## 2024-03-22 RX ORDER — DOXYCYCLINE 100 MG/1
100 TABLET ORAL 2 TIMES DAILY
Qty: 180 TABLET | Refills: 0 | Status: SHIPPED | OUTPATIENT
Start: 2024-03-22 | End: 2024-06-20

## 2024-03-22 RX ORDER — HYDROXYCHLOROQUINE SULFATE 200 MG/1
TABLET, FILM COATED ORAL
COMMUNITY
Start: 2024-01-11

## 2024-03-22 RX ORDER — VERAPAMIL HYDROCHLORIDE 100 MG/1
100 CAPSULE, EXTENDED RELEASE ORAL
COMMUNITY
Start: 2024-01-10

## 2024-03-22 RX ORDER — RIMEGEPANT SULFATE 75 MG/75MG
75 TABLET, ORALLY DISINTEGRATING ORAL DAILY PRN
COMMUNITY
Start: 2023-10-23

## 2024-03-22 RX ORDER — HYDROXYZINE PAMOATE 50 MG/1
CAPSULE ORAL
COMMUNITY
Start: 2024-03-10

## 2024-03-22 RX ORDER — PREGABALIN 100 MG/1
100 CAPSULE ORAL
COMMUNITY
Start: 2024-01-03

## 2024-03-22 NOTE — TELEPHONE ENCOUNTER
PA for Dutasteride 0.5mg capsules    Submitted via    []CMM-KEY   []Surescripts-Case ID #   []Faxed to plan   [x]Other website - [facility] Portal Prior Auth (EOC) ID:  [id number]  []Phone call Case ID #     Office notes sent, clinical questions answered. Awaiting determination    Turnaround time for your insurance to make a decision on your Prior Authorization can take 7-21 business days.

## 2024-03-22 NOTE — PATIENT INSTRUCTIONS
Assessment and Plan:  Based on a thorough discussion of this condition and the management approach to it (including a comprehensive discussion of the known risks, side effects and potential benefits of treatment), the patient (family) agrees to implement the following specific plan:  Start Clobetasol 0.05% solution topically to scalp twice a day with Over the counter Minoxidil 5% men strength solution for the next 3 months   Start Doxycycline 100 mg twice a day by mouth to help with inflammation for the next 3 months after meal and full glass of water    Start Dutasteride 0.5 mg by mouth once a day for the next 3 months   If no improvement will consider topical Ryder inhibitor   Oral steroids mentioned but will defer due to Addisons disease   Follow up in 3 months

## 2024-03-22 NOTE — PROGRESS NOTES
"[facility]ermatology Clinic Note     Patient Name: Galdino eVla  Encounter Date: 03/22/2024     Have you been cared for by a [facility] Dermatologist in the last 3 years and, if so, which description applies to you?    NO.   I am considered a \"new\" patient and must complete all patient intake questions. I am FEMALE/of child-bearing potential.    REVIEW OF SYSTEMS:  Have you recently had or currently have any of the following? Recent fever or chills? No  Any non-healing wound? No  Are you pregnant or planning to become pregnant? No  Are you currently or planning to be nursing or breast feeding? No   PAST MEDICAL HISTORY:  Have you personally ever had or currently have any of the following?  If \"YES,\" then please provide more detail. Skin cancer (such as Melanoma, Basal Cell Carcinoma, Squamous Cell Carcinoma?  No  Tuberculosis, HIV/AIDS, Hepatitis B or C: No  Radiation Treatment No   HISTORY OF IMMUNOSUPPRESSION:   Do you have a history of any of the following:  Systemic Immunosuppression such as Diabetes, Biologic or Immunotherapy, Chemotherapy, Organ Transplantation, Bone Marrow Transplantation?  No    Answering \"YES\" requires the addition of the dotphrase \"IMMUNOSUPPRESSED\" as the first diagnosis of the patient's visit.   FAMILY HISTORY:  Any \"first degree relatives\" (parent, brother, sister, or child) with the following?    Skin Cancer, Pancreatic or Other Cancer? No   PATIENT EXPERIENCE:    Do you want the Dermatologist to perform a COMPLETE skin exam today including a clinical examination under the \"bra and underwear\" areas?  NO  If necessary, do we have your permission to call and leave a detailed message on your Preferred Phone number that includes your specific medical information?  Yes      Allergies   Allergen Reactions   • Metoclopramide Shortness Of Breath, Itching and Other (See Comments)     Dystonic rxn.  Dystonic reaction.     • Tramadol Itching     Itchy throat   Itchy throat      • Albumen, Egg " - Food Allergy Itching   • Carbamazepine Itching   • Citrullus Vulgaris Itching   • Ketorolac Itching     Itching  Itching     • Ketorolac Tromethamine Itching   • Latex Itching and Hives   • Oxycodone-Acetaminophen Vomiting   • Egg Solids, Whole Itching   • Medical Tape Itching and Rash   • Peanut Oil - Food Allergy Itching and Rash   • Sulfamethoxazole-Trimethoprim Hives and Rash      Current Outpatient Medications:   •  clobetasol (TEMOVATE) 0.05 % external solution, Apply topically 2 (two) times a day Topically twice a day for 2 months to scalp, Disp: 50 mL, Rfl: 2  •  doxycycline (ADOXA) 100 MG tablet, Take 1 tablet (100 mg total) by mouth 2 (two) times a day Twice a day by mouth for 2 months after meal and full glass of water, Disp: 180 tablet, Rfl: 0  •  DULoxetine (Cymbalta) 30 mg delayed release capsule, Take 30 mg by mouth daily, Disp: , Rfl:   •  dutasteride (AVODART) 0.5 mg capsule, Take 1 capsule (0.5 mg total) by mouth daily, Disp: 90 capsule, Rfl: 0  •  Emgality 120 MG/ML SOAJ, INJECT 1 ML INTO THE SKIN EVERY 28 DAYS, Disp: , Rfl:   •  hydroxychloroquine (PLAQUENIL) 200 mg tablet, Take by mouth, Disp: , Rfl:   •  hydrOXYzine pamoate (VISTARIL) 50 mg capsule, TAKE 1 CAPSULE BY MOUTH FOUR TIMES A DAY AS NEEDED FOR ANXIETY, Disp: , Rfl:   •  Lancets (OneTouch Delica Plus [id number]) MISC, TEST ONCE DAILY ROTATE FASTING OR 2 HOURS AFTER MEALS, Disp: , Rfl:   •  Nurtec 75 MG TBDP, Take 75 mg by mouth Once daily as needed, Disp: , Rfl:   •  pregabalin (LYRICA) 100 mg capsule, Take 100 mg by mouth, Disp: , Rfl:   •  Riboflavin (Vitamin B-2) 100 MG TABS, Take 200 mg by mouth daily, Disp: , Rfl:   •  verapamil (VERELAN PM) 100 MG 24 hr capsule, Take 100 mg by mouth, Disp: , Rfl:   •  Wegovy 2.4 MG/0.75ML, Inject 2.4 mg under the skin Once a week, Disp: , Rfl:   •  acarbose (PRECOSE) 25 mg tablet, TAKE 1 TABLET (25 MG TOTAL) BY MOUTH 3 (THREE) TIMES DAILY WITH MEALS., Disp: , Rfl:   •  albuterol (2.5 mg/3 mL)  0.083 % nebulizer solution, Inhale 2.5 mg, Disp: , Rfl:   •  bisacodyl (DULCOLAX) 5 mg EC tablet, Take 4 tablets (20 mg total) by mouth once for 1 dose Colonoscopy prep (Patient not taking: Reported on 9/6/2022), Disp: 4 tablet, Rfl: 0  •  Botox 200 units SOLR, , Disp: , Rfl:   •  buPROPion (WELLBUTRIN XL) 300 mg 24 hr tablet, Take 300 mg by mouth daily, Disp: , Rfl:   •  cyanocobalamin 1,000 mcg/mL, 1,000 mcg, Disp: , Rfl:   •  dexamethasone (DECADRON) 6 mg tablet, , Disp: , Rfl:   •  Diclofenac Sodium (VOLTAREN) 1 %, APPLY 1 APPLICATION TOPICALLY 4 (FOUR) TIMES A DAY. APPLY TO AFFECTED AREA. (Patient not taking: Reported on 9/6/2022), Disp: , Rfl:   •  docusate sodium (COLACE) 100 mg capsule, TAKE 1 CAPSULE BY MOUTH TWICE A DAY AS NEEDED FOR CONSTIPATION, Disp: , Rfl:   •  Docusate Sodium (DSS) 100 MG CAPS, Take 100 mg by mouth 2 (two) times a day as needed, Disp: , Rfl:   •  Eliquis 2.5 MG, , Disp: , Rfl:   •  ergocalciferol (VITAMIN D2) 50,000 units, TAKE 2 CAPSULES (100,000 UNITS TOTAL) BY MOUTH ONCE A WEEK., Disp: , Rfl:   •  FLUoxetine (PROzac) 10 mg capsule, Take 1 capsule by mouth in the morning., Disp: , Rfl:   •  folic acid (FOLVITE) 1 mg tablet, Take 1 mg by mouth daily, Disp: , Rfl:   •  hydrocortisone (CORTEF) 5 mg tablet, Take 2 tabs (10mg) in the morning  and 1 tab (5mg) in the evening, Disp: , Rfl:   •  ipratropium-albuterol (DUO-NEB) 0.5-2.5 mg/3 mL nebulizer solution, INHALE 3 ML VIA NEBULIZER 4 (FOUR) TIMES A DAY AS NEEDED FOR WHEEZING OR SHORTNESS OF BREATH., Disp: , Rfl:   •  ipratropium-albuterol (DUO-NEB) 0.5-2.5 mg/3 mL nebulizer solution, INHALE 3 ML VIA NEBULIZER 4 (FOUR) TIMES A DAY AS NEEDED FOR WHEEZING OR SHORTNESS OF BREATH., Disp: , Rfl:   •  lidocaine-prilocaine (EMLA) cream,  Start: 10/19/21 15:50:00 EDT, 1 appl, topical, ONCE, Disp: , Rfl:   •  linaCLOtide 290 MCG CAPS, Take 1 capsule by mouth daily, Disp: 30 capsule, Rfl: 5  •  mometasone-formoterol (Dulera) 200-5 MCG/ACT  inhaler, Inhale 2 puffs 2 (two) times a day, Disp: , Rfl:   •  mupirocin (BACTROBAN) 2 % cream,  Start: 10/19/21 15:53:00 EDT, 1 appl, topical, tid, Disp: , Rfl:   •  mupirocin (BACTROBAN) 2 % ointment, Apply topically daily, Disp: , Rfl:   •  nystatin (MYCOSTATIN) ointment, Apply 1 application topically 2 (two) times a day, Disp: , Rfl:   •  OneTouch Ultra test strip, E11.649 TEST TWICE DAILY, Disp: , Rfl:   •  oxyCODONE (ROXICODONE) 5 immediate release tablet, , Disp: , Rfl:   •  paliperidone (INVEGA) 1.5 MG 24 hr tablet, Take 3 mg by mouth daily, Disp: , Rfl:   •  pantoprazole (PROTONIX) 40 mg tablet, Take 1 tablet by mouth once daily, Disp: 30 tablet, Rfl: 0  •  Phentermine HCl (Lomaira) 8 MG TABS, Take 8 mg by mouth, Disp: , Rfl:   •  polyethylene glycol (GOLYTELY) 4000 mL solution, Take 4,000 mL by mouth once for 1 dose Colonoscopy prep, Disp: 4000 mL, Rfl: 0  •  polyethylene glycol (MIRALAX) 17 g packet, Take 17 g by mouth daily, Disp: 510 g, Rfl: 5  •  Sodium Fluoride 5000 Sensitive 1.1-5 % GEL, BRUSH WITH PASTE TWICE A DAY SPIT OUT NOT DO NOT RINSE, Disp: , Rfl:   •  Symbicort 160-4.5 MCG/ACT inhaler, Inhale 2 puffs 2 (two) times a day, Disp: , Rfl:   •  tiZANidine (ZANAFLEX) 4 mg tablet, Take 1 tablet by mouth every 6 (six) hours as needed, Disp: , Rfl:   •  topiramate (TOPAMAX) 25 mg tablet, Take 1 tablet by mouth 2 (two) times a day, Disp: , Rfl:   •  triamcinolone (KENALOG) 0.1 % ointment, Apply 1 application topically 2 (two) times a day, Disp: , Rfl:   •  Vraylar 6 MG capsule, Take 6 mg by mouth daily, Disp: , Rfl:   •  zolpidem (AMBIEN CR) 12.5 MG CR tablet, TAKE 1 TABLET BY MOUTH AT BEDTIME AS NEEDED INSOMNIA, Disp: , Rfl:           Whom besides the patient is providing clinical information about today's encounter?   NO ADDITIONAL HISTORIAN (patient alone provided history)    Physical Exam and Assessment/Plan by Diagnosis:      Alopecia Areata with Central Centrifugal Alopecia   Physical  Exam:  Anatomic Location Affected: parietal scalp & vertex scalp   Morphological Description:  parietal scalp round patches of alopecia, vertex with patchy scarring hair loss  Pertinent Positives:  Pertinent Negatives:    Additional History of Present Condition:  started in 2000. Went to a dermatologist [id number] and was given Kenalog injection but discontinued due the frequent headaches and discoloration. She does recall a biopsy being done which ruled out alopecia areata. Later prescribed a topical but not sure of name. At this time she is just applying hair growth oil. She states her scalp is usually itchy. In August 2023 she cut her hair off to give it a new start but the patches will still be there. She states her hair would get brittle and dry. 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 Patient did have a hysterectomy      Assessment and Plan:  Based on a thorough discussion of this condition and the management approach to it (including a comprehensive discussion of the known risks, side effects and potential benefits of treatment), the patient (family) agrees to implement the following specific plan:  Start Clobetasol 0.05% solution topically to scalp twice a day with Over the counter Minoxidil 5% men strength solution for the next 3 months   Start Doxycycline 100 mg twice a day by mouth to help with inflammation for the next 3 months after meal and full glass of water    Start Dutasteride 0.5 mg by mouth once a day for the next 3 months   If no improvement will consider topical Ryder inhibitor - oral could be considered but has a history of blood clots and would need to be used with caution  Oral steroids mentioned but 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

## 2024-03-25 NOTE — TELEPHONE ENCOUNTER
PA for Dutasteride cancelled due to     []Approval on file-dates approved   [x]Medication already on Formulary    []Brand Name Preferred  []Patient no longer covered by insurance    Patient advised by     [x]My Chart Message  [x]Phone call    Message sent to office clinical pool   No      Scanned into Media  no

## 2024-04-25 ENCOUNTER — TELEPHONE (OUTPATIENT)
Dept: DERMATOLOGY | Facility: CLINIC | Age: 49
End: 2024-04-25

## 2024-04-25 NOTE — TELEPHONE ENCOUNTER
Called and spoke with pt. Made her aware there is not availability at this time for dr. Valery hernandez for [facility] but her schedule should be opening sometime in May. Informed pt I did add her to the recall list and she should get a notification through my chart to contact office to schedule. Advised her to call mid may to schedule if she does not hear from the office.   Appt scheduled with Dr. Farris in July in  just in case

## 2024-04-25 NOTE — TELEPHONE ENCOUNTER
----- Message from Keeley Quinones sent at 4/1/2024  3:24 PM EDT -----  Regarding: appt  Schedule follow up 6/2024 for hairloss

## 2024-06-17 ENCOUNTER — TELEPHONE (OUTPATIENT)
Age: 49
End: 2024-06-17

## 2024-06-17 DIAGNOSIS — L63.9 ALOPECIA AREATA: ICD-10-CM

## 2024-06-17 DIAGNOSIS — L66.8 CENTRAL CENTRIFUGAL SCARRING ALOPECIA: ICD-10-CM

## 2024-06-17 RX ORDER — CLOBETASOL PROPIONATE 0.46 MG/ML
SOLUTION TOPICAL
Qty: 50 ML | Refills: 2 | Status: SHIPPED | OUTPATIENT
Start: 2024-06-17

## 2024-06-17 NOTE — TELEPHONE ENCOUNTER
Refill Authorization Request for Clobetasol 0.05% SOL - from [facility] scanned into chart

## 2024-07-29 ENCOUNTER — OFFICE VISIT (OUTPATIENT)
Dept: DERMATOLOGY | Facility: CLINIC | Age: 49
End: 2024-07-29
Payer: COMMERCIAL

## 2024-07-29 VITALS — BODY MASS INDEX: 33.54 KG/M2 | WEIGHT: 201.3 LBS | HEIGHT: 65 IN | TEMPERATURE: 97.5 F

## 2024-07-29 DIAGNOSIS — L66.8 CENTRAL CENTRIFUGAL SCARRING ALOPECIA: Primary | ICD-10-CM

## 2024-07-29 DIAGNOSIS — L63.9 ALOPECIA AREATA: ICD-10-CM

## 2024-07-29 PROCEDURE — 99214 OFFICE O/P EST MOD 30 MIN: CPT | Performed by: STUDENT IN AN ORGANIZED HEALTH CARE EDUCATION/TRAINING PROGRAM

## 2024-07-29 RX ORDER — DOXYCYCLINE HYCLATE 20 MG
TABLET ORAL
Qty: 180 TABLET | Refills: 1 | Status: SHIPPED | OUTPATIENT
Start: 2024-07-29 | End: 2025-01-25

## 2024-07-29 NOTE — PROGRESS NOTES
"[facility] Note     Patient Name: Galdino Vela  Encounter Date: 07/29/2024     Have you been cared for by a [facility] Dermatologist in the last 3 years and, if so, which description applies to you?    Yes.  I have been here within the last 3 years, and my medical history has NOT changed since that time.  I am FEMALE/of child-bearing potential.    REVIEW OF SYSTEMS:  Have you recently had or currently have any of the following? No changes in my recent health.   PAST MEDICAL HISTORY:  Have you personally ever had or currently have any of the following?  If \"YES,\" then please provide more detail. No changes in my medical history.   HISTORY OF IMMUNOSUPPRESSION: Do you have a history of any of the following:  Systemic Immunosuppression such as Diabetes, Biologic or Immunotherapy, Chemotherapy, Organ Transplantation, Bone Marrow Transplantation?  No     Answering \"YES\" requires the addition of the dotphrase \"IMMUNOSUPPRESSED\" as the first diagnosis of the patient's visit.   FAMILY HISTORY:  Any \"first degree relatives\" (parent, brother, sister, or child) with the following?    No changes in my family's known health.   PATIENT EXPERIENCE:    Do you want the Dermatologist to perform a COMPLETE skin exam today including a clinical examination under the \"bra and underwear\" areas?  NO  If necessary, do we have your permission to call and leave a detailed message on your Preferred Phone number that includes your specific medical information?  Yes      Allergies   Allergen Reactions    Metoclopramide Shortness Of Breath, Itching and Other (See Comments)     Dystonic rxn.  Dystonic reaction.      Tramadol Itching     Itchy throat   Itchy throat       Albumen, Egg - Food Allergy Itching    Carbamazepine Itching    Citrullus Vulgaris Itching     Watermelon, melon, honeydrew    Kenalog [Triamcinolone] Other (See Comments) and Headache     Discoloration    Ketorolac Itching     Itching  Itching      Ketorolac " Tromethamine Itching    Latex Itching and Hives    Oxycodone-Acetaminophen Vomiting    Adalimumab Palpitations     Heart palpitations, acute kidey imjury, dehydration    Cheese-Fernanda Flavor - Food Allergy Itching    Egg Solids, Whole Itching    Iodinated Contrast Media Itching    Medical Tape Itching and Rash    Nsaids Other (See Comments)     GI bleed    Nutritional Supplements Itching    Peanut Oil - Food Allergy Itching and Rash    Sulfamethoxazole-Trimethoprim Hives and Rash      Current Outpatient Medications:     acarbose (PRECOSE) 25 mg tablet, TAKE 1 TABLET (25 MG TOTAL) BY MOUTH 3 (THREE) TIMES DAILY WITH MEALS., Disp: , Rfl:     albuterol (2.5 mg/3 mL) 0.083 % nebulizer solution, Inhale 2.5 mg, Disp: , Rfl:     buPROPion (WELLBUTRIN XL) 300 mg 24 hr tablet, Take 300 mg by mouth daily, Disp: , Rfl:     clobetasol (TEMOVATE) 0.05 % external solution, Apply twice weekly to scalp, Disp: 50 mL, Rfl: 2    cyanocobalamin 1,000 mcg/mL, 1,000 mcg, Disp: , Rfl:     docusate sodium (COLACE) 100 mg capsule, TAKE 1 CAPSULE BY MOUTH TWICE A DAY AS NEEDED FOR CONSTIPATION, Disp: , Rfl:     Docusate Sodium (DSS) 100 MG CAPS, Take 100 mg by mouth 2 (two) times a day as needed, Disp: , Rfl:     DULoxetine (Cymbalta) 30 mg delayed release capsule, Take 30 mg by mouth daily, Disp: , Rfl:     dutasteride (AVODART) 0.5 mg capsule, Take 1 capsule (0.5 mg total) by mouth daily, Disp: 90 capsule, Rfl: 0    Emgality 120 MG/ML SOAJ, INJECT 1 ML INTO THE SKIN EVERY 28 DAYS, Disp: , Rfl:     ergocalciferol (VITAMIN D2) 50,000 units, TAKE 2 CAPSULES (100,000 UNITS TOTAL) BY MOUTH ONCE A WEEK., Disp: , Rfl:     FLUoxetine (PROzac) 10 mg capsule, Take 1 capsule by mouth in the morning., Disp: , Rfl:     folic acid (FOLVITE) 1 mg tablet, Take 1 mg by mouth daily, Disp: , Rfl:     hydrocortisone (CORTEF) 5 mg tablet, Take 2 tabs (10mg) in the morning  and 1 tab (5mg) in the evening, Disp: , Rfl:     hydroxychloroquine (PLAQUENIL) 200 mg  tablet, Take by mouth, Disp: , Rfl:     hydrOXYzine pamoate (VISTARIL) 50 mg capsule, TAKE 1 CAPSULE BY MOUTH FOUR TIMES A DAY AS NEEDED FOR ANXIETY, Disp: , Rfl:     ipratropium-albuterol (DUO-NEB) 0.5-2.5 mg/3 mL nebulizer solution, INHALE 3 ML VIA NEBULIZER 4 (FOUR) TIMES A DAY AS NEEDED FOR WHEEZING OR SHORTNESS OF BREATH., Disp: , Rfl:     ipratropium-albuterol (DUO-NEB) 0.5-2.5 mg/3 mL nebulizer solution, INHALE 3 ML VIA NEBULIZER 4 (FOUR) TIMES A DAY AS NEEDED FOR WHEEZING OR SHORTNESS OF BREATH., Disp: , Rfl:     Lancets (OneTouch Delica Plus [id number]) MISC, TEST ONCE DAILY ROTATE FASTING OR 2 HOURS AFTER MEALS, Disp: , Rfl:     lidocaine-prilocaine (EMLA) cream,  Start: 10/19/21 15:50:00 EDT, 1 appl, topical, ONCE, Disp: , Rfl:     linaCLOtide 290 MCG CAPS, Take 1 capsule by mouth daily, Disp: 30 capsule, Rfl: 5    mometasone-formoterol (Dulera) 200-5 MCG/ACT inhaler, Inhale 2 puffs 2 (two) times a day, Disp: , Rfl:     mupirocin (BACTROBAN) 2 % cream,  Start: 10/19/21 15:53:00 EDT, 1 appl, topical, tid, Disp: , Rfl:     mupirocin (BACTROBAN) 2 % ointment, Apply topically daily, Disp: , Rfl:     Nurtec 75 MG TBDP, Take 75 mg by mouth Once daily as needed, Disp: , Rfl:     nystatin (MYCOSTATIN) ointment, Apply 1 application topically 2 (two) times a day, Disp: , Rfl:     OneTouch Ultra test strip, E11.649 TEST TWICE DAILY, Disp: , Rfl:     polyethylene glycol (MIRALAX) 17 g packet, Take 17 g by mouth daily, Disp: 510 g, Rfl: 5    pregabalin (LYRICA) 100 mg capsule, Take 100 mg by mouth, Disp: , Rfl:     Riboflavin (Vitamin B-2) 100 MG TABS, Take 200 mg by mouth daily, Disp: , Rfl:     Sodium Fluoride 5000 Sensitive 1.1-5 % GEL, BRUSH WITH PASTE TWICE A DAY SPIT OUT NOT DO NOT RINSE, Disp: , Rfl:     Symbicort 160-4.5 MCG/ACT inhaler, Inhale 2 puffs 2 (two) times a day, Disp: , Rfl:     tiZANidine (ZANAFLEX) 4 mg tablet, Take 1 tablet by mouth every 6 (six) hours as needed, Disp: , Rfl:     topiramate  (TOPAMAX) 25 mg tablet, Take 1 tablet by mouth 2 (two) times a day, Disp: , Rfl:     triamcinolone (KENALOG) 0.1 % ointment, Apply 1 application topically 2 (two) times a day, Disp: , Rfl:     verapamil (VERELAN PM) 100 MG 24 hr capsule, Take 100 mg by mouth, Disp: , Rfl:     Wegovy 2.4 MG/0.75ML, Inject 2.4 mg under the skin Once a week, Disp: , Rfl:     zolpidem (AMBIEN CR) 12.5 MG CR tablet, TAKE 1 TABLET BY MOUTH AT BEDTIME AS NEEDED INSOMNIA, Disp: , Rfl:     bisacodyl (DULCOLAX) 5 mg EC tablet, Take 4 tablets (20 mg total) by mouth once for 1 dose Colonoscopy prep (Patient not taking: Reported on 9/6/2022), Disp: 4 tablet, Rfl: 0    Botox 200 units SOLR, , Disp: , Rfl:     dexamethasone (DECADRON) 6 mg tablet, , Disp: , Rfl:     Diclofenac Sodium (VOLTAREN) 1 %, APPLY 1 APPLICATION TOPICALLY 4 (FOUR) TIMES A DAY. APPLY TO AFFECTED AREA. (Patient not taking: Reported on 9/6/2022), Disp: , Rfl:     Eliquis 2.5 MG, , Disp: , Rfl:     oxyCODONE (ROXICODONE) 5 immediate release tablet, , Disp: , Rfl:     paliperidone (INVEGA) 1.5 MG 24 hr tablet, Take 3 mg by mouth daily, Disp: , Rfl:     pantoprazole (PROTONIX) 40 mg tablet, Take 1 tablet by mouth once daily, Disp: 30 tablet, Rfl: 0    Phentermine HCl (Lomaira) 8 MG TABS, Take 8 mg by mouth, Disp: , Rfl:     polyethylene glycol (GOLYTELY) 4000 mL solution, Take 4,000 mL by mouth once for 1 dose Colonoscopy prep, Disp: 4000 mL, Rfl: 0    Vraylar 6 MG capsule, Take 6 mg by mouth daily, Disp: , Rfl:           Whom besides the patient is providing clinical information about today's encounter?   NO ADDITIONAL HISTORIAN (patient alone provided history)    Physical Exam and Assessment/Plan by Diagnosis:    ALOPECIA AREATA  CENTRAL CENTRIFUGAL SCARRING ALOPECIA    Physical Exam:  Anatomic Location Affected:  Parietal scalp, vertex scalp  Morphological Description:    Parietal and vertex with patchy scarring hair loss with loss of follicular orifices and scant hair  No  perifollicular erythema or scale.  Pertinent Positives:  Pertinent Negatives: No involvement of the eyebrows or eyelashes        Assessment and Plan:    Previous notes: started in 2000. Went to a dermatologist [phone] and was given Kenalog injection but discontinued due the frequent headaches and discoloration. She does recall a biopsy being done which was suggestive of alopecia areata. Later prescribed a topical but not sure of name.     At her last visit she was prescribed doxycycline 100 mg BID, dutasteride, topical clobetasol and topical rogaine and has been using these consistently. She notes that the pruritus in the areas of involvement and the hair shedding has stopped since initiating this regimen. However she feels that the hair growth she has experienced has been in the areas surrounding areas of predominant hair loss.      Currently on oral steroids (cortef) for her [place]'s disease and Plaquenil for Rheumatoid arthritis. Patient did have a hysterectomy      - Patient does not have evidence of ongoing CCCA activity based on symptoms and clinical exam. As such, will taper doxycycline. She will remain on other medications which appear to be helping with background hair density which can improve overall hair appearance.  - Based on a thorough discussion of this condition and the management approach to it (including a comprehensive discussion of the known risks, side effects and potential benefits of treatment), the patient (family) agrees to implement the following specific plan:  Clobetasol clobetasol 0.05% solution topically to scalp twice a day Mondays through Fridays  Continue topical rogaine BID  STOP doxycycline 100 mg BID  Start Doxycycline 20 mg: Take one pill twice daily with food and large glass of water. Take second dose of medication at least 2 hours prior to bed. Avoid sun during use of this medication.   Continue Dutasteride 0.5 mg by mouth once a day  Continue plaquenil (prescribed by  separate provider for alternative diagnosis)  Follow up in 3 months to ensure no recurrence of CCCA symptoms          Scribe Attestation      I,:  Kallie Valenzuela MA am acting as a scribe while in the presence of the attending physician.:       I,:  Chip Farris MD personally performed the services described in this documentation    as scribed in my presence.:

## 2024-10-30 ENCOUNTER — OFFICE VISIT (OUTPATIENT)
Age: 49
End: 2024-10-30
Payer: COMMERCIAL

## 2024-10-30 VITALS — TEMPERATURE: 97.6 F | BODY MASS INDEX: 29.79 KG/M2 | WEIGHT: 179 LBS

## 2024-10-30 DIAGNOSIS — L63.9 ALOPECIA AREATA: ICD-10-CM

## 2024-10-30 DIAGNOSIS — L66.81 CENTRAL CENTRIFUGAL SCARRING ALOPECIA: ICD-10-CM

## 2024-10-30 PROCEDURE — 99214 OFFICE O/P EST MOD 30 MIN: CPT | Performed by: REGISTERED NURSE

## 2024-10-30 RX ORDER — CLOBETASOL PROPIONATE 0.5 MG/ML
SOLUTION TOPICAL
Qty: 50 ML | Refills: 2 | Status: SHIPPED | OUTPATIENT
Start: 2024-10-30

## 2024-10-30 RX ORDER — DUTASTERIDE 0.5 MG/1
0.5 CAPSULE, LIQUID FILLED ORAL DAILY
Qty: 90 CAPSULE | Refills: 0 | Status: SHIPPED | OUTPATIENT
Start: 2024-10-30 | End: 2025-01-28

## 2024-10-30 RX ORDER — DOXYCYCLINE HYCLATE 20 MG
TABLET ORAL
Qty: 180 TABLET | Refills: 1 | Status: SHIPPED | OUTPATIENT
Start: 2024-10-30 | End: 2025-04-28

## 2024-10-30 NOTE — PROGRESS NOTES
"[facility] Note     Patient Name: Galdino Vela  Encounter Date: 10/30/2024     Have you been cared for by a [facility] Dermatologist in the last 3 years and, if so, which description applies to you?    Yes.  I have been here within the last 3 years, and my medical history has NOT changed since that time.  I am FEMALE/of child-bearing potential.    REVIEW OF SYSTEMS:  Have you recently had or currently have any of the following? No changes in my recent health.   PAST MEDICAL HISTORY:  Have you personally ever had or currently have any of the following?  If \"YES,\" then please provide more detail. No changes in my medical history.   HISTORY OF IMMUNOSUPPRESSION: Do you have a history of any of the following:  Systemic Immunosuppression such as Diabetes, Biologic or Immunotherapy, Chemotherapy, Organ Transplantation, Bone Marrow Transplantation or Prednisone?  YES, Rheumatoid arthritis      Answering \"YES\" requires the addition of the dotphrase \"IMMUNOSUPPRESSED\" as the first diagnosis of the patient's visit.   FAMILY HISTORY:  Any \"first degree relatives\" (parent, brother, sister, or child) with the following?    No changes in my family's known health.   PATIENT EXPERIENCE:    Do you want the Dermatologist to perform a COMPLETE skin exam today including a clinical examination under the \"bra and underwear\" areas?  NO  If necessary, do we have your permission to call and leave a detailed message on your Preferred Phone number that includes your specific medical information?  Yes      Allergies   Allergen Reactions    Metoclopramide Shortness Of Breath, Itching and Other (See Comments)     Dystonic rxn.  Dystonic reaction.      Tramadol Itching     Itchy throat   Itchy throat       Albumen, Egg - Food Allergy Itching    Carbamazepine Itching    Citrullus Vulgaris Itching     Watermelon, melon, honeydrew    Kenalog [Triamcinolone] Other (See Comments) and Headache     Discoloration    Ketorolac Itching "     Itching  Itching      Ketorolac Tromethamine Itching    Latex Itching and Hives    Oxycodone-Acetaminophen Vomiting    Adalimumab Palpitations     Heart palpitations, acute kidey imjury, dehydration    Cheese-Fernanda Flavor - Food Allergy Itching    Egg Solids, Whole Itching    Iodinated Contrast Media Itching    Medical Tape Itching and Rash    Nsaids Other (See Comments)     GI bleed    Nutritional Supplements Itching    Peanut Oil - Food Allergy Itching and Rash    Sulfamethoxazole-Trimethoprim Hives and Rash      Current Outpatient Medications:     acarbose (PRECOSE) 25 mg tablet, TAKE 1 TABLET (25 MG TOTAL) BY MOUTH 3 (THREE) TIMES DAILY WITH MEALS., Disp: , Rfl:     albuterol (2.5 mg/3 mL) 0.083 % nebulizer solution, Inhale 2.5 mg, Disp: , Rfl:     bisacodyl (DULCOLAX) 5 mg EC tablet, Take 4 tablets (20 mg total) by mouth once for 1 dose Colonoscopy prep (Patient not taking: Reported on 9/6/2022), Disp: 4 tablet, Rfl: 0    Botox 200 units SOLR, , Disp: , Rfl:     buPROPion (WELLBUTRIN XL) 300 mg 24 hr tablet, Take 300 mg by mouth daily, Disp: , Rfl:     clobetasol (TEMOVATE) 0.05 % external solution, Apply twice weekly to scalp, Disp: 50 mL, Rfl: 2    cyanocobalamin 1,000 mcg/mL, 1,000 mcg, Disp: , Rfl:     dexamethasone (DECADRON) 6 mg tablet, , Disp: , Rfl:     Diclofenac Sodium (VOLTAREN) 1 %, APPLY 1 APPLICATION TOPICALLY 4 (FOUR) TIMES A DAY. APPLY TO AFFECTED AREA. (Patient not taking: Reported on 9/6/2022), Disp: , Rfl:     docusate sodium (COLACE) 100 mg capsule, TAKE 1 CAPSULE BY MOUTH TWICE A DAY AS NEEDED FOR CONSTIPATION, Disp: , Rfl:     Docusate Sodium (DSS) 100 MG CAPS, Take 100 mg by mouth 2 (two) times a day as needed, Disp: , Rfl:     doxycycline (PERIOSTAT) 20 MG tablet, Take one pill twice daily with food and large glass of water. Take second dose of medication at least 2 hours prior to bed. Avoid sun during use of this medication., Disp: 180 tablet, Rfl: 1    DULoxetine (Cymbalta) 30 mg  delayed release capsule, Take 30 mg by mouth daily, Disp: , Rfl:     dutasteride (AVODART) 0.5 mg capsule, Take 1 capsule (0.5 mg total) by mouth daily, Disp: 90 capsule, Rfl: 0    Eliquis 2.5 MG, , Disp: , Rfl:     Emgality 120 MG/ML SOAJ, INJECT 1 ML INTO THE SKIN EVERY 28 DAYS, Disp: , Rfl:     ergocalciferol (VITAMIN D2) 50,000 units, TAKE 2 CAPSULES (100,000 UNITS TOTAL) BY MOUTH ONCE A WEEK., Disp: , Rfl:     FLUoxetine (PROzac) 10 mg capsule, Take 1 capsule by mouth in the morning., Disp: , Rfl:     folic acid (FOLVITE) 1 mg tablet, Take 1 mg by mouth daily, Disp: , Rfl:     hydrocortisone (CORTEF) 5 mg tablet, Take 2 tabs (10mg) in the morning  and 1 tab (5mg) in the evening, Disp: , Rfl:     hydroxychloroquine (PLAQUENIL) 200 mg tablet, Take by mouth, Disp: , Rfl:     hydrOXYzine pamoate (VISTARIL) 50 mg capsule, TAKE 1 CAPSULE BY MOUTH FOUR TIMES A DAY AS NEEDED FOR ANXIETY, Disp: , Rfl:     ipratropium-albuterol (DUO-NEB) 0.5-2.5 mg/3 mL nebulizer solution, INHALE 3 ML VIA NEBULIZER 4 (FOUR) TIMES A DAY AS NEEDED FOR WHEEZING OR SHORTNESS OF BREATH., Disp: , Rfl:     ipratropium-albuterol (DUO-NEB) 0.5-2.5 mg/3 mL nebulizer solution, INHALE 3 ML VIA NEBULIZER 4 (FOUR) TIMES A DAY AS NEEDED FOR WHEEZING OR SHORTNESS OF BREATH., Disp: , Rfl:     Lancets (OneTouch Delica Plus [id number]) MISC, TEST ONCE DAILY ROTATE FASTING OR 2 HOURS AFTER MEALS, Disp: , Rfl:     lidocaine-prilocaine (EMLA) cream,  Start: 10/19/21 15:50:00 EDT, 1 appl, topical, ONCE, Disp: , Rfl:     linaCLOtide 290 MCG CAPS, Take 1 capsule by mouth daily, Disp: 30 capsule, Rfl: 5    mometasone-formoterol (Dulera) 200-5 MCG/ACT inhaler, Inhale 2 puffs 2 (two) times a day, Disp: , Rfl:     mupirocin (BACTROBAN) 2 % cream,  Start: 10/19/21 15:53:00 EDT, 1 appl, topical, tid, Disp: , Rfl:     mupirocin (BACTROBAN) 2 % ointment, Apply topically daily, Disp: , Rfl:     Nurtec 75 MG TBDP, Take 75 mg by mouth Once daily as needed, Disp: , Rfl:      nystatin (MYCOSTATIN) ointment, Apply 1 application topically 2 (two) times a day, Disp: , Rfl:     OneTouch Ultra test strip, E11.649 TEST TWICE DAILY, Disp: , Rfl:     oxyCODONE (ROXICODONE) 5 immediate release tablet, , Disp: , Rfl:     paliperidone (INVEGA) 1.5 MG 24 hr tablet, Take 3 mg by mouth daily, Disp: , Rfl:     pantoprazole (PROTONIX) 40 mg tablet, Take 1 tablet by mouth once daily, Disp: 30 tablet, Rfl: 0    Phentermine HCl (Lomaira) 8 MG TABS, Take 8 mg by mouth, Disp: , Rfl:     polyethylene glycol (GOLYTELY) 4000 mL solution, Take 4,000 mL by mouth once for 1 dose Colonoscopy prep, Disp: 4000 mL, Rfl: 0    polyethylene glycol (MIRALAX) 17 g packet, Take 17 g by mouth daily, Disp: 510 g, Rfl: 5    pregabalin (LYRICA) 100 mg capsule, Take 100 mg by mouth, Disp: , Rfl:     Riboflavin (Vitamin B-2) 100 MG TABS, Take 200 mg by mouth daily, Disp: , Rfl:     Sodium Fluoride 5000 Sensitive 1.1-5 % GEL, BRUSH WITH PASTE TWICE A DAY SPIT OUT NOT DO NOT RINSE, Disp: , Rfl:     Symbicort 160-4.5 MCG/ACT inhaler, Inhale 2 puffs 2 (two) times a day, Disp: , Rfl:     tiZANidine (ZANAFLEX) 4 mg tablet, Take 1 tablet by mouth every 6 (six) hours as needed, Disp: , Rfl:     topiramate (TOPAMAX) 25 mg tablet, Take 1 tablet by mouth 2 (two) times a day, Disp: , Rfl:     triamcinolone (KENALOG) 0.1 % ointment, Apply 1 application topically 2 (two) times a day, Disp: , Rfl:     verapamil (VERELAN PM) 100 MG 24 hr capsule, Take 100 mg by mouth, Disp: , Rfl:     Vraylar 6 MG capsule, Take 6 mg by mouth daily, Disp: , Rfl:     Wegovy 2.4 MG/0.75ML, Inject 2.4 mg under the skin Once a week, Disp: , Rfl:     zolpidem (AMBIEN CR) 12.5 MG CR tablet, TAKE 1 TABLET BY MOUTH AT BEDTIME AS NEEDED INSOMNIA, Disp: , Rfl:           Whom besides the patient is providing clinical information about today's encounter?   NO ADDITIONAL HISTORIAN (patient alone provided history)    Physical Exam and Assessment/Plan by Diagnosis:      CENTRAL  CENTRIFUGAL SCARRING ALOPECIA     Physical Exam:  Anatomic Location Affected:  Parietal scalp, vertex scalp  Morphological Description:    Parietal and vertex with patchy scarring hair loss with loss of follicular ostia. No bogginess appreciated (parietal hair loss more significant than vertex)   No perifollicular erythema or scale.  Pertinent Positives:  Pertinent Negatives: No involvement of the eyebrows or eyelashes, No tenderness, pruritus, tingling and or burning sensation.                          Assessment and Plan:  Patient was last seen on 7/29/2024 by Dr. Farris.  She was to continue dutasteride but never received a refill.  She feels that the dutasteride was helping her. 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   Continue Clobetasol clobetasol 0.05% solution topically to scalp two times a day three days a week   Continue topical rogaine BID  Continue Doxycycline 20 mg: Take one pill twice daily with food and large glass of water. Take second dose of medication at least 2 hours prior to bed. Avoid sun during use of this medication.   Restart Dutasteride 0.5 mg by mouth once a day, Side effects previously reviewed with patient.  Continue plaquenil (prescribed by separate provider for alternative diagnosis)  Follow up in 6 months to ensure no recurrence of symptoms              Scribe Attestation      I,:   am acting as a scribe while in the presence of the attending physician.:       I,:   personally performed the services described in this documentation    as scribed in my presence.:

## 2025-01-26 DIAGNOSIS — L63.9 ALOPECIA AREATA: ICD-10-CM

## 2025-01-26 DIAGNOSIS — L66.81 CENTRAL CENTRIFUGAL SCARRING ALOPECIA: ICD-10-CM

## 2025-01-29 RX ORDER — DUTASTERIDE 0.5 MG/1
0.5 CAPSULE, LIQUID FILLED ORAL DAILY
Qty: 90 CAPSULE | Refills: 0 | Status: SHIPPED | OUTPATIENT
Start: 2025-01-29

## 2025-04-25 ENCOUNTER — OFFICE VISIT (OUTPATIENT)
Dept: URGENT CARE | Facility: CLINIC | Age: 50
End: 2025-04-25
Payer: COMMERCIAL

## 2025-04-25 ENCOUNTER — APPOINTMENT (OUTPATIENT)
Dept: RADIOLOGY | Facility: CLINIC | Age: 50
End: 2025-04-25
Attending: PREVENTIVE MEDICINE
Payer: COMMERCIAL

## 2025-04-25 VITALS
WEIGHT: 172.6 LBS | SYSTOLIC BLOOD PRESSURE: 112 MMHG | HEART RATE: 67 BPM | BODY MASS INDEX: 28.72 KG/M2 | TEMPERATURE: 97.8 F | OXYGEN SATURATION: 100 % | RESPIRATION RATE: 20 BRPM | DIASTOLIC BLOOD PRESSURE: 64 MMHG

## 2025-04-25 DIAGNOSIS — S99.922A FOOT INJURY, LEFT, INITIAL ENCOUNTER: ICD-10-CM

## 2025-04-25 DIAGNOSIS — M79.672 LEFT FOOT PAIN: Primary | ICD-10-CM

## 2025-04-25 PROCEDURE — 73610 X-RAY EXAM OF ANKLE: CPT

## 2025-04-25 PROCEDURE — 99213 OFFICE O/P EST LOW 20 MIN: CPT | Performed by: PREVENTIVE MEDICINE

## 2025-04-25 PROCEDURE — 73630 X-RAY EXAM OF FOOT: CPT

## 2025-04-25 RX ORDER — BACITRACIN, NEOMYCIN, POLYMYXIN B 400; 3.5; 5 [USP'U]/G; MG/G; [USP'U]/G
2 OINTMENT TOPICAL ONCE
Status: SHIPPED | OUTPATIENT
Start: 2025-04-25

## 2025-04-25 RX ORDER — FUROSEMIDE 20 MG/1
TABLET ORAL
COMMUNITY
Start: 2024-10-28

## 2025-04-25 RX ORDER — METHOCARBAMOL 750 MG/1
750 TABLET, FILM COATED ORAL 4 TIMES DAILY PRN
COMMUNITY
Start: 2025-04-18 | End: 2025-04-28

## 2025-04-25 RX ORDER — BUSPIRONE HYDROCHLORIDE 5 MG/1
5 TABLET ORAL 2 TIMES DAILY
COMMUNITY
Start: 2024-12-04

## 2025-04-25 RX ORDER — MECLIZINE HYDROCHLORIDE 25 MG/1
25 TABLET ORAL 3 TIMES DAILY PRN
COMMUNITY
Start: 2025-02-14

## 2025-04-25 RX ORDER — VONOPRAZAN FUMARATE 26.72 MG/1
20 TABLET ORAL DAILY
COMMUNITY

## 2025-04-25 RX ORDER — TOPIRAMATE 50 MG/1
1 TABLET, FILM COATED ORAL 2 TIMES DAILY
COMMUNITY
Start: 2025-03-11

## 2025-04-25 RX ORDER — LIDOCAINE 50 MG/G
PATCH TOPICAL
COMMUNITY
Start: 2025-02-24

## 2025-04-25 RX ORDER — GLUCAGON INJECTION, SOLUTION 1 MG/.2ML
1 INJECTION, SOLUTION SUBCUTANEOUS
COMMUNITY
Start: 2025-04-04

## 2025-04-25 RX ORDER — ESZOPICLONE 3 MG/1
3 TABLET, FILM COATED ORAL
COMMUNITY

## 2025-04-25 RX ORDER — PREDNISONE 50 MG/1
TABLET ORAL
COMMUNITY
Start: 2025-04-18

## 2025-04-25 RX ORDER — DIPHENHYDRAMINE HYDROCHLORIDE 25 MG/1
CAPSULE ORAL
COMMUNITY
Start: 2025-04-18

## 2025-04-25 RX ORDER — TIRZEPATIDE 5 MG/.5ML
5 INJECTION, SOLUTION SUBCUTANEOUS WEEKLY
COMMUNITY
Start: 2025-04-01

## 2025-04-25 RX ORDER — BUPROPION HYDROCHLORIDE 100 MG/1
TABLET ORAL
COMMUNITY
Start: 2025-04-22

## 2025-04-25 NOTE — PROGRESS NOTES
"  [facility]        NAME: Galdino Vela is a 50 y.o. female  : 1975    MRN: [id number]  DATE: 2025  TIME: 10:38 AM    Assessment and Plan   Left foot pain [M79.672]  1. Left foot pain              Patient Instructions       Follow up with PCP in 3-5 days.  Proceed to  ER if symptoms worsen.    If tests have been performed at [facility], our office will contact you with results if changes need to be made to the care plan discussed with you at the visit.  [facility]    Chief Complaint     Chief Complaint   Patient presents with   • Fall     Reports falling down stairs last night on her back and states she \"jammed\" her left foot and toes into metal dumb bells at the bottom of the stairs. States there was a lot of bleeding last night from the toes. Now having pain and decreased mobility of the left foot and toes. Denies hitting her head. 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    • Skin Problem     Reports \"green\" colored \"bruise\" and lump to the lower left leg as well as swelling to the left ankle and foot that was first noticed yesterday prior to her fall. States she has a hx of \"blood clots.\"          History of Present Illness       He fell down the stairs jamming her left foot at the bottom of the stairs.  She now has pain in the left foot.  Also there is bleeding between the 4th and 5th toe on the left foot.  In addition, small lump on the left calf she would like checked out    Fall      Review of Systems   Review of Systems   Musculoskeletal:  Positive for arthralgias and gait problem.   Skin:  Positive for wound.         Current Medications       Current Outpatient Medications:   •  acarbose (PRECOSE) 25 mg tablet, TAKE 1 TABLET (25 MG TOTAL) BY MOUTH 3 (THREE) TIMES DAILY WITH MEALS., Disp: , Rfl:   •  albuterol (2.5 mg/3 mL) 0.083 % nebulizer solution, Inhale 2.5 mg, Disp: , Rfl:   •  " buPROPion (WELLBUTRIN) 100 mg tablet, , Disp: , Rfl:   •  busPIRone (BUSPAR) 5 mg tablet, Take 5 mg by mouth 2 (two) times a day, Disp: , Rfl:   •  clobetasol (TEMOVATE) 0.05 % external solution, Apply two to three times weekly to scalp, Disp: 50 mL, Rfl: 2  •  Diclofenac Sodium (VOLTAREN) 1 %, , Disp: , Rfl:   •  Docusate Sodium (DSS) 100 MG CAPS, Take 100 mg by mouth 2 (two) times a day as needed, Disp: , Rfl:   •  doxycycline (PERIOSTAT) 20 MG tablet, Take one pill twice daily with food and large glass of water. Take second dose of medication at least 2 hours prior to bed. Avoid sun during use of this medication., Disp: 180 tablet, Rfl: 1  •  dutasteride (AVODART) 0.5 mg capsule, TAKE 1 CAPSULE BY MOUTH EVERY DAY, Disp: 90 capsule, Rfl: 0  •  Emgality 120 MG/ML SOAJ, INJECT 1 ML INTO THE SKIN EVERY 28 DAYS, Disp: , Rfl:   •  ergocalciferol (VITAMIN D2) 50,000 units, TAKE 2 CAPSULES (100,000 UNITS TOTAL) BY MOUTH ONCE A WEEK., Disp: , Rfl:   •  eszopiclone (LUNESTA) 3 MG tablet, Take 3 mg by mouth daily at bedtime, Disp: , Rfl:   •  folic acid (FOLVITE) 1 mg tablet, Take 1 mg by mouth daily, Disp: , Rfl:   •  furosemide (LASIX) 20 mg tablet, TAKE 1 TABLET (20 MG TOTAL) BY MOUTH DAILY AS NEEDED FOR LEG SWELLING, Disp: , Rfl:   •  Gvoke HypoPen 2-Pack 1 MG/0.2ML SOAJ, Inject 1 mg under the skin (Patient taking differently: Inject 1 mg under the skin), Disp: , Rfl:   •  hydrocortisone (CORTEF) 5 mg tablet, Take 2 tabs (10mg) in the morning  and 1 tab (5mg) in the evening, Disp: , Rfl:   •  hydroxychloroquine (PLAQUENIL) 200 mg tablet, Take by mouth, Disp: , Rfl:   •  ipratropium-albuterol (DUO-NEB) 0.5-2.5 mg/3 mL nebulizer solution, INHALE 3 ML VIA NEBULIZER 4 (FOUR) TIMES A DAY AS NEEDED FOR WHEEZING OR SHORTNESS OF BREATH., Disp: , Rfl:   •  Lancets (OneTouch Delica Plus [id number]) MISC, TEST ONCE DAILY ROTATE FASTING OR 2 HOURS AFTER MEALS, Disp: , Rfl:   •  lidocaine (LIDODERM) 5 %, PLEASE SEE ATTACHED FOR  DETAILED DIRECTIONS, Disp: , Rfl:   •  lidocaine-prilocaine (EMLA) cream,  Start: 10/19/21 15:50:00 EDT, 1 appl, topical, ONCE, Disp: , Rfl:   •  linaCLOtide 290 MCG CAPS, Take 1 capsule by mouth daily, Disp: 30 capsule, Rfl: 5  •  meclizine (ANTIVERT) 25 mg tablet, Take 25 mg by mouth Three times daily as needed, Disp: , Rfl:   •  methocarbamol (ROBAXIN) 750 mg tablet, Take 750 mg by mouth 4 (four) times a day as needed, Disp: , Rfl:   •  mometasone-formoterol (Dulera) 200-5 MCG/ACT inhaler, Inhale 2 puffs 2 (two) times a day, Disp: , Rfl:   •  mupirocin (BACTROBAN) 2 % ointment, Apply topically daily, Disp: , Rfl:   •  Nurtec 75 MG TBDP, Take 75 mg by mouth Once daily as needed, Disp: , Rfl:   •  nystatin (MYCOSTATIN) ointment, Apply 1 application topically 2 (two) times a day, Disp: , Rfl:   •  OneTouch Ultra test strip, E11.649 TEST TWICE DAILY, Disp: , Rfl:   •  polyethylene glycol (MIRALAX) 17 g packet, Take 17 g by mouth daily, Disp: 510 g, Rfl: 5  •  pregabalin (LYRICA) 100 mg capsule, Take 100 mg by mouth, Disp: , Rfl:   •  Riboflavin (Vitamin B-2) 100 MG TABS, Take 200 mg by mouth daily, Disp: , Rfl:   •  Sodium Fluoride 5000 Sensitive 1.1-5 % GEL, BRUSH WITH PASTE TWICE A DAY SPIT OUT NOT DO NOT RINSE, Disp: , Rfl:   •  Symbicort 160-4.5 MCG/ACT inhaler, Inhale 2 puffs 2 (two) times a day, Disp: , Rfl:   •  tiZANidine (ZANAFLEX) 4 mg tablet, Take 1 tablet by mouth every 6 (six) hours as needed, Disp: , Rfl:   •  topiramate (TOPAMAX) 50 MG tablet, Take 1 tablet by mouth 2 (two) times a day, Disp: , Rfl:   •  triamcinolone (KENALOG) 0.1 % ointment, Apply 1 application topically 2 (two) times a day, Disp: , Rfl:   •  Zepbound 5 MG/0.5ML auto-injector, Inject 5 mg under the skin Once a week, Disp: , Rfl:   •  Banophen 25 MG capsule, , Disp: , Rfl:   •  bisacodyl (DULCOLAX) 5 mg EC tablet, Take 4 tablets (20 mg total) by mouth once for 1 dose Colonoscopy prep (Patient not taking: Reported on 9/6/2022), Disp: 4  tablet, Rfl: 0  •  Botox 200 units SOLR, , Disp: , Rfl:   •  buPROPion (WELLBUTRIN XL) 300 mg 24 hr tablet, Take 300 mg by mouth daily (Patient not taking: Reported on 4/25/2025), Disp: , Rfl:   •  cyanocobalamin 1,000 mcg/mL, 1,000 mcg (Patient not taking: Reported on 4/25/2025), Disp: , Rfl:   •  dexamethasone (DECADRON) 6 mg tablet, , Disp: , Rfl:   •  docusate sodium (COLACE) 100 mg capsule, TAKE 1 CAPSULE BY MOUTH TWICE A DAY AS NEEDED FOR CONSTIPATION (Patient not taking: Reported on 4/25/2025), Disp: , Rfl:   •  DULoxetine (Cymbalta) 30 mg delayed release capsule, Take 30 mg by mouth daily (Patient not taking: Reported on 4/25/2025), Disp: , Rfl:   •  Eliquis 2.5 MG, , Disp: , Rfl:   •  FLUoxetine (PROzac) 10 mg capsule, Take 1 capsule by mouth in the morning. (Patient not taking: Reported on 4/25/2025), Disp: , Rfl:   •  GLUCAGON HCL IJ, Inject 1 mg into a muscle (Patient not taking: Reported on 4/25/2025), Disp: , Rfl:   •  hydrOXYzine pamoate (VISTARIL) 50 mg capsule, TAKE 1 CAPSULE BY MOUTH FOUR TIMES A DAY AS NEEDED FOR ANXIETY (Patient not taking: Reported on 4/25/2025), Disp: , Rfl:   •  ipratropium-albuterol (DUO-NEB) 0.5-2.5 mg/3 mL nebulizer solution, INHALE 3 ML VIA NEBULIZER 4 (FOUR) TIMES A DAY AS NEEDED FOR WHEEZING OR SHORTNESS OF BREATH. (Patient not taking: Reported on 4/25/2025), Disp: , Rfl:   •  mupirocin (BACTROBAN) 2 % cream,  Start: 10/19/21 15:53:00 EDT, 1 appl, topical, tid (Patient not taking: Reported on 4/25/2025), Disp: , Rfl:   •  oxyCODONE (ROXICODONE) 5 immediate release tablet, , Disp: , Rfl:   •  paliperidone (INVEGA) 1.5 MG 24 hr tablet, Take 3 mg by mouth daily, Disp: , Rfl:   •  pantoprazole (PROTONIX) 40 mg tablet, Take 1 tablet by mouth once daily, Disp: 30 tablet, Rfl: 0  •  Phentermine HCl (Lomaira) 8 MG TABS, Take 8 mg by mouth, Disp: , Rfl:   •  polyethylene glycol (GOLYTELY) 4000 mL solution, Take 4,000 mL by mouth once for 1 dose Colonoscopy prep, Disp: 4000 mL,  Rfl: 0  •  predniSONE 50 mg tablet, , Disp: , Rfl:   •  topiramate (TOPAMAX) 25 mg tablet, Take 1 tablet by mouth 2 (two) times a day (Patient not taking: Reported on 4/25/2025), Disp: , Rfl:   •  verapamil (VERELAN PM) 100 MG 24 hr capsule, Take 100 mg by mouth (Patient not taking: Reported on 4/25/2025), Disp: , Rfl:   •  Vonoprazan Fumarate (Voquezna) 20 MG TABS, Take 20 mg by mouth daily (Patient not taking: Reported on 4/25/2025), Disp: , Rfl:   •  Vraylar 6 MG capsule, Take 6 mg by mouth daily, Disp: , Rfl:   •  Wegovy 2.4 MG/0.75ML, Inject 2.4 mg under the skin Once a week (Patient not taking: Reported on 4/25/2025), Disp: , Rfl:   •  zolpidem (AMBIEN CR) 12.5 MG CR tablet, TAKE 1 TABLET BY MOUTH AT BEDTIME AS NEEDED INSOMNIA (Patient not taking: Reported on 4/25/2025), Disp: , Rfl:     Current Allergies     Allergies as of 04/25/2025 - Reviewed 04/25/2025   Allergen Reaction Noted   • Metoclopramide Shortness Of Breath, Itching, and Other (See Comments) 05/09/2014   • Tramadol Itching 05/18/2016   • Albumen, egg - food allergy Itching 11/18/2020   • Carbamazepine Itching 07/21/2014   • Citrullus vulgaris Itching 08/05/2014   • Kenalog [triamcinolone] Other (See Comments) and Headache 07/29/2024   • Ketorolac Itching 05/09/2014   • Ketorolac tromethamine Itching 02/16/2022   • Latex Itching and Hives 02/16/2022   • Other Other (See Comments) 05/12/2024   • Oxycodone-acetaminophen Vomiting 05/22/2019   • Adalimumab Palpitations 02/02/2024   • Cheese-mariajose flavor - food allergy Itching 08/05/2014   • Egg solids, whole Itching 08/05/2014   • Iodinated contrast media Itching 05/09/2014   • Medical tape Itching and Rash 05/18/2016   • Nsaids Other (See Comments) 05/09/2014   • Nutritional supplements Itching 04/27/2023   • Peanut oil - food allergy Itching and Rash 05/22/2019   • Sulfamethoxazole-trimethoprim Hives and Rash 05/18/2016            The following portions of the patient's history were reviewed and  "updated as appropriate: allergies, current medications, past family history, past medical history, past social history, past surgical history and problem list.     Past Medical History:   Diagnosis Date   • Yayo disease (HCC)    • Anemia     iron deficent   • Anxiety    • Asthma    • B12 deficiency anemia    • Chronic constipation    • Chronic pain syndrome    • Congenital anomaly of endocrine glands    • Deficiency of adrenal hormone (HCC)    • Degenerative joint disease     lower leg   • Disease of circulatory system    • Embolus (HCC)     \"blood clot to left ovary\" \"thrombus of the ovary vein\"   • Empty sella syndrome (HCC)    • Fibromyalgia    • Hypertension    • Hypoglycemia    • Incontinence    • Intestinal obstruction (HCC)    • Medication adverse effect     \"Drugs and medicine substance causing adverse effect\"   • Migraine     tension headache   • Mixed hyperlipidemia    • Nerve pain     head   • Nocturnal leg cramps    • Pulmonary embolism (HCC)    • Renal colic    • Rheumatoid arthritis (HCC)    • Seizure disorder (HCC)     \"CMS/HCC\", \"Spells Non-epileptiform\"   • Spell of altered cognition    • Thiamin deficiency    • Thrombus    • Vitamin D deficiency    • Weakness of both legs    • Zinc deficiency        Past Surgical History:   Procedure Laterality Date   • ABDOMINAL SURGERY      gastric bypass   • CENTRAL VENOUS CATHETER INSERTION Right     \"Power Port\" 10/2019   • FL LUMBAR PUNCTURE DIAGNOSTIC  06/05/2023   • GASTRIC BYPASS     • HYSTERECTOMY     • JOINT REPLACEMENT      \"three knee replacements\"       No family history on file.      Medications have been verified.        Objective   /64 (BP Location: Left arm)   Pulse 67   Temp 97.8 °F (36.6 °C) (Tympanic)   Resp 20   Wt 78.3 kg (172 lb 9.6 oz)   SpO2 100%   BMI 28.72 kg/m²   No LMP recorded. Patient has had a hysterectomy.       Physical Exam     Physical Exam  Musculoskeletal:      Comments: The left foot is mildly swollen with some " pain to palpation over the 5th and 4th metatarsal.   Skin:     Comments: Very small lump times surface of the left calf with a ring of erythema around it.  It appears to be a varicose vein.

## 2025-04-28 ENCOUNTER — TELEPHONE (OUTPATIENT)
Dept: URGENT CARE | Facility: CLINIC | Age: 50
End: 2025-04-28
Payer: COMMERCIAL

## 2025-04-28 DIAGNOSIS — S92.902A CLOSED FRACTURE OF LEFT FOOT, INITIAL ENCOUNTER: Primary | ICD-10-CM

## 2025-04-28 PROCEDURE — 99213 OFFICE O/P EST LOW 20 MIN: CPT | Performed by: PHYSICIAN ASSISTANT

## 2025-04-28 NOTE — TELEPHONE ENCOUNTER
Called the patient to go over x-ray results.      IMPRESSION: Small fracture at the medial base of the fifth proximal phalanx.    Patient reports worsening pain and swelling of her foot.  Recommended the patient come back for a boot.  Referral for podiatry placed.  
No - the patient is unable to be screened due to medical condition

## 2025-04-28 NOTE — PROGRESS NOTES
Pt came into office as per Katey LONG PA-C request to be fitted for an ortho boot. Provided pt with a boot size medium.

## 2025-04-30 ENCOUNTER — OFFICE VISIT (OUTPATIENT)
Age: 50
End: 2025-04-30
Payer: COMMERCIAL

## 2025-04-30 VITALS — WEIGHT: 170 LBS | BODY MASS INDEX: 28.29 KG/M2 | TEMPERATURE: 97.8 F

## 2025-04-30 DIAGNOSIS — L63.9 ALOPECIA AREATA: ICD-10-CM

## 2025-04-30 DIAGNOSIS — L66.81 CENTRAL CENTRIFUGAL SCARRING ALOPECIA: ICD-10-CM

## 2025-04-30 DIAGNOSIS — L65.0 ACUTE TELOGEN EFFLUVIUM: Primary | ICD-10-CM

## 2025-04-30 PROCEDURE — 99213 OFFICE O/P EST LOW 20 MIN: CPT | Performed by: REGISTERED NURSE

## 2025-04-30 NOTE — PROGRESS NOTES
"[facility] Dermatology Clinic Note     Patient Name: Galdino Vela  Encounter Date: 4/30/2025     Have you been cared for by a [facility] Dermatologist in the last 3 years and, if so, which description applies to you? Yes. I have been here within the last 3 years, and my medical history has NOT changed since that time. I am not of child-bearing potential.     REVIEW OF SYSTEMS:  Have you recently had or currently have any of the following? No changes in my recent health.   PAST MEDICAL HISTORY:  Have you personally ever had or currently have any of the following?  If \"YES,\" then please provide more detail. No changes in my medical history.   HISTORY OF IMMUNOSUPPRESSION: Do you have a history of any of the following:  Systemic Immunosuppression such as Diabetes, Biologic or Immunotherapy, Chemotherapy, Organ Transplantation, Bone Marrow Transplantation or Prednisone?  YES - Rheumatoid arthritis.     Answering \"YES\" requires the addition of the dotphrase \"IMMUNOSUPPRESSED\" as the first diagnosis of the patient's visit.   FAMILY HISTORY:  Any \"first degree relatives\" (parent, brother, sister, or child) with the following?    No changes in my family's known health.   PATIENT EXPERIENCE:    Do you want the Dermatologist to perform a COMPLETE skin exam today including a clinical examination under the \"bra and underwear\" areas?  NO  If necessary, do we have your permission to call and leave a detailed message on your Preferred Phone number that includes your specific medical information?  Yes      Allergies   Allergen Reactions    Metoclopramide Shortness Of Breath, Itching and Other (See Comments)     Dystonic rxn.  Dystonic reaction.      Tramadol Itching     Itchy throat   Itchy throat       Albumen, Egg - Food Allergy Itching    Carbamazepine Itching    Citrullus Vulgaris Itching     Watermelon, melon, honeydrew    Kenalog [Triamcinolone] Other (See Comments) and Headache     Discoloration    Ketorolac Itching     " "Itching  Itching      Ketorolac Tromethamine Itching    Latex Itching and Hives    Other Other (See Comments)     Patient reports an \"arthritis medication\" but is unsure of name. Reaction: \"kidney injury requiring hospitalization\"    Oxycodone-Acetaminophen Vomiting    Adalimumab Palpitations     Heart palpitations, acute kidey imjury, dehydration    Cheese-Fernanda Flavor - Food Allergy Itching    Egg Solids, Whole Itching    Iodinated Contrast Media Itching    Medical Tape Itching and Rash    Nsaids Other (See Comments)     GI bleed    Nutritional Supplements Itching    Peanut Oil - Food Allergy Itching and Rash    Sulfamethoxazole-Trimethoprim Hives and Rash      Current Outpatient Medications:     acarbose (PRECOSE) 25 mg tablet, TAKE 1 TABLET (25 MG TOTAL) BY MOUTH 3 (THREE) TIMES DAILY WITH MEALS., Disp: , Rfl:     albuterol (2.5 mg/3 mL) 0.083 % nebulizer solution, Inhale 2.5 mg, Disp: , Rfl:     Banophen 25 MG capsule, , Disp: , Rfl:     bisacodyl (DULCOLAX) 5 mg EC tablet, Take 4 tablets (20 mg total) by mouth once for 1 dose Colonoscopy prep (Patient not taking: Reported on 9/6/2022), Disp: 4 tablet, Rfl: 0    Botox 200 units SOLR, , Disp: , Rfl:     buPROPion (WELLBUTRIN XL) 300 mg 24 hr tablet, Take 300 mg by mouth daily (Patient not taking: Reported on 4/25/2025), Disp: , Rfl:     buPROPion (WELLBUTRIN) 100 mg tablet, , Disp: , Rfl:     busPIRone (BUSPAR) 5 mg tablet, Take 5 mg by mouth 2 (two) times a day, Disp: , Rfl:     clobetasol (TEMOVATE) 0.05 % external solution, Apply two to three times weekly to scalp, Disp: 50 mL, Rfl: 2    cyanocobalamin 1,000 mcg/mL, 1,000 mcg (Patient not taking: Reported on 4/25/2025), Disp: , Rfl:     dexamethasone (DECADRON) 6 mg tablet, , Disp: , Rfl:     Diclofenac Sodium (VOLTAREN) 1 %, , Disp: , Rfl:     docusate sodium (COLACE) 100 mg capsule, TAKE 1 CAPSULE BY MOUTH TWICE A DAY AS NEEDED FOR CONSTIPATION (Patient not taking: Reported on 4/25/2025), Disp: , Rfl:     " Docusate Sodium (DSS) 100 MG CAPS, Take 100 mg by mouth 2 (two) times a day as needed, Disp: , Rfl:     DULoxetine (Cymbalta) 30 mg delayed release capsule, Take 30 mg by mouth daily (Patient not taking: Reported on 4/25/2025), Disp: , Rfl:     dutasteride (AVODART) 0.5 mg capsule, TAKE 1 CAPSULE BY MOUTH EVERY DAY, Disp: 90 capsule, Rfl: 0    Eliquis 2.5 MG, , Disp: , Rfl:     Emgality 120 MG/ML SOAJ, INJECT 1 ML INTO THE SKIN EVERY 28 DAYS, Disp: , Rfl:     ergocalciferol (VITAMIN D2) 50,000 units, TAKE 2 CAPSULES (100,000 UNITS TOTAL) BY MOUTH ONCE A WEEK., Disp: , Rfl:     eszopiclone (LUNESTA) 3 MG tablet, Take 3 mg by mouth daily at bedtime, Disp: , Rfl:     FLUoxetine (PROzac) 10 mg capsule, Take 1 capsule by mouth in the morning. (Patient not taking: Reported on 4/25/2025), Disp: , Rfl:     folic acid (FOLVITE) 1 mg tablet, Take 1 mg by mouth daily, Disp: , Rfl:     furosemide (LASIX) 20 mg tablet, TAKE 1 TABLET (20 MG TOTAL) BY MOUTH DAILY AS NEEDED FOR LEG SWELLING, Disp: , Rfl:     GLUCAGON HCL IJ, Inject 1 mg into a muscle (Patient not taking: Reported on 4/25/2025), Disp: , Rfl:     Gvoke HypoPen 2-Pack 1 MG/0.2ML SOAJ, Inject 1 mg under the skin (Patient taking differently: Inject 1 mg under the skin), Disp: , Rfl:     hydrocortisone (CORTEF) 5 mg tablet, Take 2 tabs (10mg) in the morning  and 1 tab (5mg) in the evening, Disp: , Rfl:     hydroxychloroquine (PLAQUENIL) 200 mg tablet, Take by mouth, Disp: , Rfl:     hydrOXYzine pamoate (VISTARIL) 50 mg capsule, TAKE 1 CAPSULE BY MOUTH FOUR TIMES A DAY AS NEEDED FOR ANXIETY (Patient not taking: Reported on 4/25/2025), Disp: , Rfl:     ipratropium-albuterol (DUO-NEB) 0.5-2.5 mg/3 mL nebulizer solution, INHALE 3 ML VIA NEBULIZER 4 (FOUR) TIMES A DAY AS NEEDED FOR WHEEZING OR SHORTNESS OF BREATH. (Patient not taking: Reported on 4/25/2025), Disp: , Rfl:     ipratropium-albuterol (DUO-NEB) 0.5-2.5 mg/3 mL nebulizer solution, INHALE 3 ML VIA NEBULIZER 4 (FOUR)  TIMES A DAY AS NEEDED FOR WHEEZING OR SHORTNESS OF BREATH., Disp: , Rfl:     Lancets (OneTouch Delica Plus [id number]) MISC, TEST ONCE DAILY ROTATE FASTING OR 2 HOURS AFTER MEALS, Disp: , Rfl:     lidocaine (LIDODERM) 5 %, PLEASE SEE ATTACHED FOR DETAILED DIRECTIONS, Disp: , Rfl:     lidocaine-prilocaine (EMLA) cream,  Start: 10/19/21 15:50:00 EDT, 1 appl, topical, ONCE, Disp: , Rfl:     linaCLOtide 290 MCG CAPS, Take 1 capsule by mouth daily, Disp: 30 capsule, Rfl: 5    meclizine (ANTIVERT) 25 mg tablet, Take 25 mg by mouth Three times daily as needed, Disp: , Rfl:     methocarbamol (ROBAXIN) 750 mg tablet, Take 750 mg by mouth 4 (four) times a day as needed, Disp: , Rfl:     mometasone-formoterol (Dulera) 200-5 MCG/ACT inhaler, Inhale 2 puffs 2 (two) times a day, Disp: , Rfl:     mupirocin (BACTROBAN) 2 % cream,  Start: 10/19/21 15:53:00 EDT, 1 appl, topical, tid (Patient not taking: Reported on 4/25/2025), Disp: , Rfl:     mupirocin (BACTROBAN) 2 % ointment, Apply topically daily, Disp: , Rfl:     Nurtec 75 MG TBDP, Take 75 mg by mouth Once daily as needed, Disp: , Rfl:     nystatin (MYCOSTATIN) ointment, Apply 1 application topically 2 (two) times a day, Disp: , Rfl:     OneTouch Ultra test strip, E11.649 TEST TWICE DAILY, Disp: , Rfl:     oxyCODONE (ROXICODONE) 5 immediate release tablet, , Disp: , Rfl:     paliperidone (INVEGA) 1.5 MG 24 hr tablet, Take 3 mg by mouth daily, Disp: , Rfl:     pantoprazole (PROTONIX) 40 mg tablet, Take 1 tablet by mouth once daily, Disp: 30 tablet, Rfl: 0    Phentermine HCl (Lomaira) 8 MG TABS, Take 8 mg by mouth, Disp: , Rfl:     polyethylene glycol (GOLYTELY) 4000 mL solution, Take 4,000 mL by mouth once for 1 dose Colonoscopy prep, Disp: 4000 mL, Rfl: 0    polyethylene glycol (MIRALAX) 17 g packet, Take 17 g by mouth daily, Disp: 510 g, Rfl: 5    predniSONE 50 mg tablet, , Disp: , Rfl:     pregabalin (LYRICA) 100 mg capsule, Take 100 mg by mouth, Disp: , Rfl:     Riboflavin  (Vitamin B-2) 100 MG TABS, Take 200 mg by mouth daily, Disp: , Rfl:     Sodium Fluoride 5000 Sensitive 1.1-5 % GEL, BRUSH WITH PASTE TWICE A DAY SPIT OUT NOT DO NOT RINSE, Disp: , Rfl:     Symbicort 160-4.5 MCG/ACT inhaler, Inhale 2 puffs 2 (two) times a day, Disp: , Rfl:     tiZANidine (ZANAFLEX) 4 mg tablet, Take 1 tablet by mouth every 6 (six) hours as needed, Disp: , Rfl:     topiramate (TOPAMAX) 25 mg tablet, Take 1 tablet by mouth 2 (two) times a day (Patient not taking: Reported on 4/25/2025), Disp: , Rfl:     topiramate (TOPAMAX) 50 MG tablet, Take 1 tablet by mouth 2 (two) times a day, Disp: , Rfl:     triamcinolone (KENALOG) 0.1 % ointment, Apply 1 application topically 2 (two) times a day, Disp: , Rfl:     verapamil (VERELAN PM) 100 MG 24 hr capsule, Take 100 mg by mouth (Patient not taking: Reported on 4/25/2025), Disp: , Rfl:     Vonoprazan Fumarate (Voquezna) 20 MG TABS, Take 20 mg by mouth daily (Patient not taking: Reported on 4/25/2025), Disp: , Rfl:     Vraylar 6 MG capsule, Take 6 mg by mouth daily, Disp: , Rfl:     Wegovy 2.4 MG/0.75ML, Inject 2.4 mg under the skin Once a week (Patient not taking: Reported on 4/25/2025), Disp: , Rfl:     Zepbound 5 MG/0.5ML auto-injector, Inject 5 mg under the skin Once a week, Disp: , Rfl:     zolpidem (AMBIEN CR) 12.5 MG CR tablet, TAKE 1 TABLET BY MOUTH AT BEDTIME AS NEEDED INSOMNIA (Patient not taking: Reported on 4/25/2025), Disp: , Rfl:     Current Facility-Administered Medications:     neomycin-bacitracin-polymyxin b (NEOSPORIN) ointment 2 small application, 2 small application, Topical, Once,               Whom besides the patient is providing clinical information about today's encounter?   NO ADDITIONAL HISTORIAN (patient alone provided history)    Physical Exam and Assessment/Plan by Diagnosis:    CENTRAL CENTRIFUGAL CICATRICIAL ALOPECIA WITH SUPERIMPOSED  ACUTE TELOGEN EFFLUVIUM    Physical Exam:  Anatomic Location Affected:  Parietal scalp, vertex  scalp  Morphological Description:    Parietal and vertex with patchy scarring hair loss with loss of follicular ostia. No bogginess appreciated (parietal hair loss more significant than vertex)   No perifollicular erythema or scale.  Pertinent Positives:  Pertinent Negatives: No involvement of the eyebrows or eyelashes, No tenderness, pruritus, tingling and or burning sensation.     Assessment and Plan:  Patient was last seen on 10/30/2024 by Dr. Cruz at which time her CCCA was stable. She was advised to continue with dutestaride, plaquenil, and doxycycline, rogaine and clobetasol. Late last year she started to have flare up of her fibromyalgia and had to be hospitalized. A few months after she noticed increasing shedding of hair that has worsen her hair loss. She also fractured her left leg and leg is currently in cast.     Discussed that given history hair loss is due to telogen effluvium which is usually hair loss in response to a stressful event and or period.   Reassured that TE usually resolves but can take up to 6-9 months, however it can rarely become chronic.   Continue topical rogaine BID  Continue clobetasol 0.05% solution topically to scalp two times a day three days a week   Continue Doxycycline 20 mg: Take one pill twice daily with food and large glass of water. Take second dose of medication at least 2 hours prior to bed. Avoid sun during use of this medication.   Continue Dutasteride 0.5 mg by mouth once a day, Side effects previously reviewed with patient.  Continue plaquenil (prescribed by separate provider for alternative diagnosis)  Follow up in 6 months to ensure no recurrence of symptoms    Scribe Attestation      I,:  Jere Choi MA am acting as a scribe while in the presence of the attending physician.:       I,:  Stuart Cruz MD personally performed the services described in this documentation    as scribed in my presence.:

## 2025-05-12 ENCOUNTER — TELEPHONE (OUTPATIENT)
Age: 50
End: 2025-05-12

## 2025-05-12 NOTE — TELEPHONE ENCOUNTER
Patient called she was in the office on 4/30/25 and no medications were called in to there pharmacy she needs the clobetasol 0.05% solution, doxycycline 20 mg, dutasteride 0.5 mg, and the topical Rogaine sent to the [facility] in [place] on [id number]

## 2025-05-13 DIAGNOSIS — L63.9 ALOPECIA AREATA: ICD-10-CM

## 2025-05-13 DIAGNOSIS — L66.81 CENTRAL CENTRIFUGAL SCARRING ALOPECIA: ICD-10-CM

## 2025-05-13 RX ORDER — DUTASTERIDE 0.5 MG/1
0.5 CAPSULE, LIQUID FILLED ORAL DAILY
Qty: 90 CAPSULE | Refills: 1 | Status: SHIPPED | OUTPATIENT
Start: 2025-05-13 | End: 2025-05-15 | Stop reason: SDUPTHER

## 2025-05-15 DIAGNOSIS — L66.81 CENTRAL CENTRIFUGAL SCARRING ALOPECIA: ICD-10-CM

## 2025-05-15 DIAGNOSIS — L63.9 ALOPECIA AREATA: ICD-10-CM

## 2025-05-15 RX ORDER — CLOBETASOL PROPIONATE 0.5 MG/ML
SOLUTION TOPICAL
Qty: 50 ML | Refills: 5 | Status: SHIPPED | OUTPATIENT
Start: 2025-05-15

## 2025-05-15 RX ORDER — DUTASTERIDE 0.5 MG/1
0.5 CAPSULE, LIQUID FILLED ORAL DAILY
Qty: 90 CAPSULE | Refills: 1 | Status: SHIPPED | OUTPATIENT
Start: 2025-05-15

## 2025-05-15 RX ORDER — DOXYCYCLINE HYCLATE 20 MG
20 TABLET ORAL 2 TIMES DAILY
Qty: 60 TABLET | Refills: 3 | Status: SHIPPED | OUTPATIENT
Start: 2025-05-15 | End: 2025-09-12

## 2025-05-16 NOTE — TELEPHONE ENCOUNTER
Called and spoke with patient letting her know that the dutasteride, doxycycline, and clobetasol external solution are at the pharmacy for her to  the rogaine is over the counter and can't be prescribed